# Patient Record
Sex: FEMALE | Race: ASIAN | NOT HISPANIC OR LATINO | URBAN - METROPOLITAN AREA
[De-identification: names, ages, dates, MRNs, and addresses within clinical notes are randomized per-mention and may not be internally consistent; named-entity substitution may affect disease eponyms.]

---

## 2023-03-07 ENCOUNTER — INPATIENT (INPATIENT)
Facility: HOSPITAL | Age: 78
LOS: 1 days | Discharge: HOME | End: 2023-03-09
Attending: INTERNAL MEDICINE | Admitting: INTERNAL MEDICINE
Payer: MEDICARE

## 2023-03-07 VITALS
SYSTOLIC BLOOD PRESSURE: 144 MMHG | TEMPERATURE: 98 F | RESPIRATION RATE: 18 BRPM | HEART RATE: 137 BPM | OXYGEN SATURATION: 96 % | DIASTOLIC BLOOD PRESSURE: 97 MMHG

## 2023-03-07 DIAGNOSIS — Z98.890 OTHER SPECIFIED POSTPROCEDURAL STATES: Chronic | ICD-10-CM

## 2023-03-07 DIAGNOSIS — E11.9 TYPE 2 DIABETES MELLITUS WITHOUT COMPLICATIONS: ICD-10-CM

## 2023-03-07 DIAGNOSIS — I48.20 CHRONIC ATRIAL FIBRILLATION, UNSPECIFIED: ICD-10-CM

## 2023-03-07 DIAGNOSIS — I50.9 HEART FAILURE, UNSPECIFIED: ICD-10-CM

## 2023-03-07 DIAGNOSIS — I10 ESSENTIAL (PRIMARY) HYPERTENSION: ICD-10-CM

## 2023-03-07 DIAGNOSIS — I48.91 UNSPECIFIED ATRIAL FIBRILLATION: ICD-10-CM

## 2023-03-07 DIAGNOSIS — Z29.9 ENCOUNTER FOR PROPHYLACTIC MEASURES, UNSPECIFIED: ICD-10-CM

## 2023-03-07 DIAGNOSIS — R00.0 TACHYCARDIA, UNSPECIFIED: ICD-10-CM

## 2023-03-07 LAB
ALBUMIN SERPL ELPH-MCNC: 4.6 G/DL — SIGNIFICANT CHANGE UP (ref 3.3–5)
ALP SERPL-CCNC: 62 U/L — SIGNIFICANT CHANGE UP (ref 40–120)
ALT FLD-CCNC: 13 U/L — SIGNIFICANT CHANGE UP (ref 4–33)
ANION GAP SERPL CALC-SCNC: 9 MMOL/L — SIGNIFICANT CHANGE UP (ref 7–14)
APTT BLD: 38.9 SEC — HIGH (ref 27–36.3)
AST SERPL-CCNC: 15 U/L — SIGNIFICANT CHANGE UP (ref 4–32)
BASOPHILS # BLD AUTO: 0.04 K/UL — SIGNIFICANT CHANGE UP (ref 0–0.2)
BASOPHILS NFR BLD AUTO: 0.5 % — SIGNIFICANT CHANGE UP (ref 0–2)
BILIRUB SERPL-MCNC: 0.6 MG/DL — SIGNIFICANT CHANGE UP (ref 0.2–1.2)
BUN SERPL-MCNC: 10 MG/DL — SIGNIFICANT CHANGE UP (ref 7–23)
CALCIUM SERPL-MCNC: 9.9 MG/DL — SIGNIFICANT CHANGE UP (ref 8.4–10.5)
CHLORIDE SERPL-SCNC: 95 MMOL/L — LOW (ref 98–107)
CO2 SERPL-SCNC: 29 MMOL/L — SIGNIFICANT CHANGE UP (ref 22–31)
CREAT SERPL-MCNC: 0.67 MG/DL — SIGNIFICANT CHANGE UP (ref 0.5–1.3)
EGFR: 89 ML/MIN/1.73M2 — SIGNIFICANT CHANGE UP
EOSINOPHIL # BLD AUTO: 0.26 K/UL — SIGNIFICANT CHANGE UP (ref 0–0.5)
EOSINOPHIL NFR BLD AUTO: 3 % — SIGNIFICANT CHANGE UP (ref 0–6)
FLUAV AG NPH QL: SIGNIFICANT CHANGE UP
FLUBV AG NPH QL: SIGNIFICANT CHANGE UP
GLUCOSE SERPL-MCNC: 163 MG/DL — HIGH (ref 70–99)
HCT VFR BLD CALC: 38.1 % — SIGNIFICANT CHANGE UP (ref 34.5–45)
HGB BLD-MCNC: 12.6 G/DL — SIGNIFICANT CHANGE UP (ref 11.5–15.5)
IANC: 4.63 K/UL — SIGNIFICANT CHANGE UP (ref 1.8–7.4)
IMM GRANULOCYTES NFR BLD AUTO: 0.2 % — SIGNIFICANT CHANGE UP (ref 0–0.9)
INR BLD: 1.49 RATIO — HIGH (ref 0.88–1.16)
LYMPHOCYTES # BLD AUTO: 2.85 K/UL — SIGNIFICANT CHANGE UP (ref 1–3.3)
LYMPHOCYTES # BLD AUTO: 32.9 % — SIGNIFICANT CHANGE UP (ref 13–44)
MCHC RBC-ENTMCNC: 29 PG — SIGNIFICANT CHANGE UP (ref 27–34)
MCHC RBC-ENTMCNC: 33.1 GM/DL — SIGNIFICANT CHANGE UP (ref 32–36)
MCV RBC AUTO: 87.6 FL — SIGNIFICANT CHANGE UP (ref 80–100)
MONOCYTES # BLD AUTO: 0.85 K/UL — SIGNIFICANT CHANGE UP (ref 0–0.9)
MONOCYTES NFR BLD AUTO: 9.8 % — SIGNIFICANT CHANGE UP (ref 2–14)
NEUTROPHILS # BLD AUTO: 4.63 K/UL — SIGNIFICANT CHANGE UP (ref 1.8–7.4)
NEUTROPHILS NFR BLD AUTO: 53.6 % — SIGNIFICANT CHANGE UP (ref 43–77)
NRBC # BLD: 0 /100 WBCS — SIGNIFICANT CHANGE UP (ref 0–0)
NRBC # FLD: 0 K/UL — SIGNIFICANT CHANGE UP (ref 0–0)
NT-PROBNP SERPL-SCNC: 566 PG/ML — HIGH
PLATELET # BLD AUTO: 273 K/UL — SIGNIFICANT CHANGE UP (ref 150–400)
POTASSIUM SERPL-MCNC: 4.5 MMOL/L — SIGNIFICANT CHANGE UP (ref 3.5–5.3)
POTASSIUM SERPL-SCNC: 4.5 MMOL/L — SIGNIFICANT CHANGE UP (ref 3.5–5.3)
PROT SERPL-MCNC: 7.9 G/DL — SIGNIFICANT CHANGE UP (ref 6–8.3)
PROTHROM AB SERPL-ACNC: 17.3 SEC — HIGH (ref 10.5–13.4)
RBC # BLD: 4.35 M/UL — SIGNIFICANT CHANGE UP (ref 3.8–5.2)
RBC # FLD: 13.1 % — SIGNIFICANT CHANGE UP (ref 10.3–14.5)
RSV RNA NPH QL NAA+NON-PROBE: SIGNIFICANT CHANGE UP
SARS-COV-2 RNA SPEC QL NAA+PROBE: SIGNIFICANT CHANGE UP
SODIUM SERPL-SCNC: 133 MMOL/L — LOW (ref 135–145)
TROPONIN T, HIGH SENSITIVITY RESULT: 10 NG/L — SIGNIFICANT CHANGE UP
TROPONIN T, HIGH SENSITIVITY RESULT: 10 NG/L — SIGNIFICANT CHANGE UP
TSH SERPL-MCNC: 3.87 UIU/ML — SIGNIFICANT CHANGE UP (ref 0.27–4.2)
WBC # BLD: 8.65 K/UL — SIGNIFICANT CHANGE UP (ref 3.8–10.5)
WBC # FLD AUTO: 8.65 K/UL — SIGNIFICANT CHANGE UP (ref 3.8–10.5)

## 2023-03-07 PROCEDURE — 99291 CRITICAL CARE FIRST HOUR: CPT | Mod: GC

## 2023-03-07 PROCEDURE — 99223 1ST HOSP IP/OBS HIGH 75: CPT

## 2023-03-07 PROCEDURE — 71045 X-RAY EXAM CHEST 1 VIEW: CPT | Mod: 26

## 2023-03-07 RX ORDER — APIXABAN 2.5 MG/1
5 TABLET, FILM COATED ORAL EVERY 12 HOURS
Refills: 0 | Status: DISCONTINUED | OUTPATIENT
Start: 2023-03-07 | End: 2023-03-09

## 2023-03-07 RX ORDER — METOPROLOL TARTRATE 50 MG
25 TABLET ORAL ONCE
Refills: 0 | Status: COMPLETED | OUTPATIENT
Start: 2023-03-07 | End: 2023-03-07

## 2023-03-07 RX ORDER — INSULIN LISPRO 100/ML
VIAL (ML) SUBCUTANEOUS AT BEDTIME
Refills: 0 | Status: DISCONTINUED | OUTPATIENT
Start: 2023-03-07 | End: 2023-03-09

## 2023-03-07 RX ORDER — INSULIN GLARGINE 100 [IU]/ML
20 INJECTION, SOLUTION SUBCUTANEOUS AT BEDTIME
Refills: 0 | Status: DISCONTINUED | OUTPATIENT
Start: 2023-03-07 | End: 2023-03-08

## 2023-03-07 RX ORDER — DEXTROSE 50 % IN WATER 50 %
25 SYRINGE (ML) INTRAVENOUS ONCE
Refills: 0 | Status: DISCONTINUED | OUTPATIENT
Start: 2023-03-07 | End: 2023-03-09

## 2023-03-07 RX ORDER — METOPROLOL TARTRATE 50 MG
5 TABLET ORAL
Refills: 0 | Status: COMPLETED | OUTPATIENT
Start: 2023-03-07 | End: 2023-03-07

## 2023-03-07 RX ORDER — SODIUM CHLORIDE 9 MG/ML
1000 INJECTION, SOLUTION INTRAVENOUS
Refills: 0 | Status: DISCONTINUED | OUTPATIENT
Start: 2023-03-07 | End: 2023-03-09

## 2023-03-07 RX ORDER — ACETAMINOPHEN 500 MG
650 TABLET ORAL EVERY 6 HOURS
Refills: 0 | Status: DISCONTINUED | OUTPATIENT
Start: 2023-03-07 | End: 2023-03-09

## 2023-03-07 RX ORDER — FLUTICASONE FUROATE AND VILANTEROL TRIFENATATE 100; 25 UG/1; UG/1
0 POWDER RESPIRATORY (INHALATION)
Qty: 0 | Refills: 0 | DISCHARGE

## 2023-03-07 RX ORDER — DEXTROSE 50 % IN WATER 50 %
12.5 SYRINGE (ML) INTRAVENOUS ONCE
Refills: 0 | Status: DISCONTINUED | OUTPATIENT
Start: 2023-03-07 | End: 2023-03-09

## 2023-03-07 RX ORDER — ALBUTEROL 90 UG/1
2 AEROSOL, METERED ORAL
Qty: 0 | Refills: 0 | DISCHARGE

## 2023-03-07 RX ORDER — SOTALOL HCL 120 MG
120 TABLET ORAL ONCE
Refills: 0 | Status: DISCONTINUED | OUTPATIENT
Start: 2023-03-07 | End: 2023-03-07

## 2023-03-07 RX ORDER — GLUCAGON INJECTION, SOLUTION 0.5 MG/.1ML
1 INJECTION, SOLUTION SUBCUTANEOUS ONCE
Refills: 0 | Status: DISCONTINUED | OUTPATIENT
Start: 2023-03-07 | End: 2023-03-09

## 2023-03-07 RX ORDER — METOPROLOL TARTRATE 50 MG
5 TABLET ORAL ONCE
Refills: 0 | Status: COMPLETED | OUTPATIENT
Start: 2023-03-07 | End: 2023-03-07

## 2023-03-07 RX ORDER — APIXABAN 2.5 MG/1
1 TABLET, FILM COATED ORAL
Qty: 0 | Refills: 0 | DISCHARGE

## 2023-03-07 RX ORDER — SIMVASTATIN 20 MG/1
10 TABLET, FILM COATED ORAL AT BEDTIME
Refills: 0 | Status: DISCONTINUED | OUTPATIENT
Start: 2023-03-07 | End: 2023-03-09

## 2023-03-07 RX ORDER — SIMVASTATIN 20 MG/1
1 TABLET, FILM COATED ORAL
Qty: 0 | Refills: 0 | DISCHARGE

## 2023-03-07 RX ORDER — DEXTROSE 50 % IN WATER 50 %
15 SYRINGE (ML) INTRAVENOUS ONCE
Refills: 0 | Status: DISCONTINUED | OUTPATIENT
Start: 2023-03-07 | End: 2023-03-09

## 2023-03-07 RX ORDER — INSULIN LISPRO 100/ML
VIAL (ML) SUBCUTANEOUS
Refills: 0 | Status: DISCONTINUED | OUTPATIENT
Start: 2023-03-07 | End: 2023-03-09

## 2023-03-07 RX ORDER — PANTOPRAZOLE SODIUM 20 MG/1
40 TABLET, DELAYED RELEASE ORAL
Refills: 0 | Status: DISCONTINUED | OUTPATIENT
Start: 2023-03-07 | End: 2023-03-09

## 2023-03-07 RX ORDER — PANTOPRAZOLE SODIUM 20 MG/1
1 TABLET, DELAYED RELEASE ORAL
Qty: 0 | Refills: 0 | DISCHARGE

## 2023-03-07 RX ORDER — FAMOTIDINE 10 MG/ML
1 INJECTION INTRAVENOUS
Qty: 0 | Refills: 0 | DISCHARGE

## 2023-03-07 RX ORDER — METOCLOPRAMIDE HCL 10 MG
5 TABLET ORAL ONCE
Refills: 0 | Status: DISCONTINUED | OUTPATIENT
Start: 2023-03-07 | End: 2023-03-07

## 2023-03-07 RX ADMIN — Medication 25 MILLIGRAM(S): at 20:40

## 2023-03-07 RX ADMIN — Medication 5 MILLIGRAM(S): at 17:30

## 2023-03-07 RX ADMIN — Medication 5 MILLIGRAM(S): at 17:16

## 2023-03-07 RX ADMIN — Medication 5 MILLIGRAM(S): at 18:06

## 2023-03-07 NOTE — CONSULT NOTE ADULT - ASSESSMENT
79 YO F with Mhx of DMII, COPD, and  afib s/p 3 ablations (10 years ago, 4 years ago, and 2/2/23) on eliquis, who presents with worsening palpitations found to be tachycardic. Cardiology consulted for concern for afib with RVR.     #Tachycardia   -s/p metoprolol 5mg IVP x3 in ED with no effect.   -likely an atrial tachycardia   -Pt with hx of atrial fibrillation s/p 3 ablations as below. (pt of Dr. Johnson)  -c/w sotalol  -Pt with baseline HR in the 120-130's at home  -Obtain TTE  -If LVEF is ok, can use diltiazem  -c/w eliquis  -Please keep NPO after midnight for possible NEMO/ DCCV    #Afib s/p 3 ablations (last was one month ago)  -c/w sotalol  -c/w eliquis    [place] Lu, Cardiology Fellow, F-1    For all New Consults and Questions:  [email or web address]   Login: [email or web address]    *** Note not final until signed by attending 77 YO F with Mhx of DMII, COPD, and  afib s/p 3 ablations (10 years ago, 4 years ago, and 2/2/23) on eliquis, who presents with worsening palpitations found to be tachycardic. Cardiology consulted for concern for afib with RVR.     #Tachycardia   -s/p metoprolol 5mg IVP x3 in ED with no effect.   -likely atrial tachycardia   -Pt with hx of atrial fibrillation s/p 3 ablations as below. (pt of Dr. Johnson)  -c/w sotalol  -Pt with baseline HR in the 120-130's at home  -Obtain TTE  -If LVEF is ok, can use diltiazem  -c/w eliquis  -check TSH/FT4, Lipids, A1c  -Please keep NPO after midnight for possible NEMO/ DCCV    #Afib s/p 3 ablations (last was one month ago)  -c/w sotalol  -c/w eliquis    [place] Lu, Cardiology Fellow, F-1    For all New Consults and Questions:  [email or web address]   Login: [email or web address]    *** Note not final until signed by attending

## 2023-03-07 NOTE — ED PROVIDER NOTE - CLINICAL SUMMARY MEDICAL DECISION MAKING FREE TEXT BOX
Layne Cisneros MD, PGY-3: (INCOMPLETE) Layne Cisneros MD, PGY-3: 79YO F hx AF on AC, s/p ablation Feb 2023, CHF, p/w CP, palpitations. vs: tachycardic, normotensive. concern for AF w/ RVR vs. SVT. also consider ACS. plan for metoprolol, consider admission for EP ablation given recurrent symptoms. plan for basic labs, trop, ekg.

## 2023-03-07 NOTE — H&P ADULT - PROBLEM SELECTOR PLAN 2
s/p mult ablations (last one 2/2023), chadsvasc of 6. plan as above. continue eliquis. continue sotalol pending repeat EKG.

## 2023-03-07 NOTE — CHART NOTE - NSCHARTNOTEFT_GEN_A_CORE
repeat ekg done w/ QTC<500, will give one dose sotalol now, repeat EKG in AM before subsequent doses repeat ekg done, discussed with EP overnight, they rec holding sotalol for now and reassess in AM

## 2023-03-07 NOTE — H&P ADULT - HISTORY OF PRESENT ILLNESS
Pt is a 78 year old woman w/ afib on AC s/p ablation in 2/2023, HTN, HLD, DM2 on insulin, GERD who presents to the ER w/ 1 day onset of worsening palpitations, chest pain, shoulder pain and shortness of breath. She reports that since her ablation in February she has not felt well but has not followed up with her cards yet either. She is compliant with all medications but reports intermittent palpitations. She reports yday during the day she began feeling vague shoulder, back and chest pain as well as palpitations which prevented her from sleeping. She had some associated sob which also lasted to this AM. No fever, cough, abd pain, diarrhea or dysuria. no LE edema reported. In the ER pt was found tachycardic and given mult rounds of metoprolol.

## 2023-03-07 NOTE — H&P ADULT - NSHPLABSRESULTS_GEN_ALL_CORE
03-07    133<L>  |  95<L>  |  10  ----------------------------<  163<H>  4.5   |  29  |  0.67    Ca    9.9      07 Mar 2023 16:33    TPro  7.9  /  Alb  4.6  /  TBili  0.6  /  DBili  x   /  AST  15  /  ALT  13  /  AlkPhos  62  03-07                            12.6   8.65  )-----------( 273      ( 07 Mar 2023 16:33 )             38.1             LIVER FUNCTIONS - ( 07 Mar 2023 16:33 )  Alb: 4.6 g/dL / Pro: 7.9 g/dL / ALK PHOS: 62 U/L / ALT: 13 U/L / AST: 15 U/L / GGT: x             PT/INR - ( 07 Mar 2023 16:33 )   PT: 17.3 sec;   INR: 1.49 ratio         PTT - ( 07 Mar 2023 16:33 )  PTT:38.9 sec    EKG: sinus tachycardia,     CXR: clear lungs

## 2023-03-07 NOTE — H&P ADULT - ASSESSMENT
78 year old woman w/ afib on AC s/p ablation in 2/2023, HTN, HLD, DM2 on insulin, GERD who presents to the ER w/ 1 day onset of worsening palpitations, chest pain, shoulder pain and shortness of breath.  In the ER pt was found tachycardic and given mult rounds of metoprolol. Admitted for further evaluation

## 2023-03-07 NOTE — ED PROVIDER NOTE - NSICDXPASTMEDICALHX_GEN_ALL_CORE_FT
PAST MEDICAL HISTORY:  Asthma     Chronic atrial fibrillation     Chronic heart failure     Diabetes mellitus     Hyperlipidemia     Hypertension

## 2023-03-07 NOTE — CHART NOTE - NSCHARTNOTEFT_GEN_A_CORE
-EKG review for QTc, manually calculated to be >500.   -can hold sotalol for tonight.   -repeat EKG in am and re-assess  -case discussed with Dr. Sugar Dawn, cardiology fellow.  -call EP [place] [phone] for a question after 7am.

## 2023-03-07 NOTE — H&P ADULT - NSHPREVIEWOFSYSTEMS_GEN_ALL_CORE
REVIEW OF SYSTEMS:    CONSTITUTIONAL: No weakness, fevers or chills  EYES/ENT: No visual changes;  No dysphagia  NECK: No pain or stiffness  RESPIRATORY: +dyspnea. No cough, wheezing, or hemoptysis  CARDIOVASCULAR: +chest pain and palpitations; No lower extremity edema  GASTROINTESTINAL: No abdominal or epigastric pain. No nausea, vomiting, or hematemesis; No diarrhea or constipation. No melena or hematochezia.  GENITOURINARY: No dysuria, frequency or hematuria  NEUROLOGICAL: No numbness or weakness  SKIN: No itching, burning, rashes, or lesions   MSK: +back pain

## 2023-03-07 NOTE — ED PROVIDER NOTE - OBJECTIVE STATEMENT
(INCOMPLETE) 79YO F hx AF on AC, s/p ablation Feb 2023, CHF, p/w CP, palpitations. onset 1d prior. R chest, shoulder, lower back. a/w SOB. pt on sotalol, spironolactone, Eliquis, no missed doses. all care through [facility] cards Dr. Vaibhav Raphael. denies recent fever, n/v, abd pain.

## 2023-03-07 NOTE — ED PROVIDER NOTE - PROGRESS NOTE DETAILS
Layne Cisneros MD, PGY-3: Pt remains tachycardic despite metoprolol IV x 3. Cards contacted at 7pm. will eval pt. stable BP persistently. Layne Cisneros PGY3 signout given to hospitalisttory for cardioversion in AM

## 2023-03-07 NOTE — ED ADULT TRIAGE NOTE - CHIEF COMPLAINT QUOTE
pt was getting physical therapy at home and had elevated heart rate - 130. pt arrives tachycardic 136, had an ablation 1 month ago. pt c/o palpitations, right sided chest pain, right shoulder pain and lower back pain intermittent x 1 month. hx. DM2, HTN, HLD, anemia, COPD

## 2023-03-07 NOTE — ED ADULT NURSE NOTE - OBJECTIVE STATEMENT
Pt came to ED room 3, aaox4, ambulatory, breathing even and unlabored in bed. PMHx of HTN, cardiac ablation. Pt came to ED after having PT this AM. Pt states before her PT her vitals were taken and showed a HR of 140s. Pt states she has recently been staying in the 140s when she is active. Pt came to ED to get examined, she states she "has felt off since her last ablation sx". Pt states since last month she has felt chest pain, dizziness, and headache everyday. Pt denies SOB. Lung sounds clear, heart sounds normal, abdomen soft and non distended. #18G placed in right AC, labs drawn and sent, bed in lowest position, call bell within reach, pt on tele monitor showing sinus tachycardia.

## 2023-03-07 NOTE — PATIENT PROFILE ADULT - FALL HARM RISK - HARM RISK INTERVENTIONS
Assistance with ambulation/Assistance OOB with selected safe patient handling equipment/Communicate Risk of Fall with Harm to all staff/Discuss with provider need for PT consult/Monitor gait and stability/Provide patient with walking aids - walker, cane, crutches/Reinforce activity limits and safety measures with patient and family/Tailored Fall Risk Interventions/Use of alarms - bed, chair and/or voice tab/Visual Cue: Yellow wristband and red socks/Bed in lowest position, wheels locked, appropriate side rails in place/Call bell, personal items and telephone in reach/Instruct patient to call for assistance before getting out of bed or chair/Non-slip footwear when patient is out of bed/[place] to call system/Physically safe environment - no spills, clutter or unnecessary equipment/Purposeful Proactive Rounding/Room/bathroom lighting operational, light cord in reach

## 2023-03-07 NOTE — ED PROVIDER NOTE - PHYSICAL EXAMINATION
Gen: WDWN, NAD  HEENT: EOMI, no nasal discharge, mucous membranes moist  CV: fast rate, regular rhythm, 2+ radial pulse L  Resp:  no accessory muscle use, no increased work of breathing  GI: Abdomen soft non-distended, NTTP  MSK: No open wounds, no bruising, no LE edema  Neuro: A&Ox4, following commands, moving all four extremities spontaneously  Psych: appropriate mood

## 2023-03-07 NOTE — CONSULT NOTE ADULT - SUBJECTIVE AND OBJECTIVE BOX
Patient seen and evaluated at bedside    Chief Complaint:palpitations    HPI:  77 YO F with Mhx of DMII, COPD, and  afib s/p 3 ablations (10 years ago, 4 years ago, and 2/2/23) on eliquis, who presents with worsening palpitations. Pt states that she has had afib for many years, had her 3rd ablation last month which was unsuccessful, and her heart rate at baseline is 120-130. She is on sotalol and takes it daily. Her palpitations were more intense today, so she came in  She denies any chest pain, syncope, shortness of breath, nausea, or vomiting, She does also complain of back pain which is chronic.       PMHx:      DMII, COPD, and  afib       Allergies:  Amaryl (Rash)  Januvia (Rash)      Home Meds:  losartan  sotalol  spironolactone   HCTZ  does not recall the others          FAMILY HISTORY:      Social History:  Smoking History: significant second hand smoke ( was heavy smoker)  Alcohol Use:denies  Drug Use:denies    REVIEW OF SYSTEMS:  Constitutional:     [x ] negative [ ] fevers [ ] chills [ ] weight loss [ ] weight gain  HEENT:                  [ x] negative [ ] dry eyes [ ] eye irritation [ ] postnasal drip [ ] nasal congestion  CV:                        as above [ ] negative  [ ] chest pain [ ] orthopnea [ ] palpitations [ ] murmur  Resp:                    as above [ ] negative [ ] cough [ ] shortness of breath [ ] dyspnea [ ] wheezing [ ] sputum [ ]hemoptysis  GI:                          [x ] negative [ ] nausea [ ] vomiting [ ] diarrhea [ ] constipation [ ] abd pain [ ] dysphagia   :                        [ x] negative [ ] dysuria [ ] nocturia [ ] hematuria [ ] increased urinary frequency  Musculoskeletal: [ x] negative [ ] back pain [ ] myalgias [ ] arthralgias [ ] fracture  Skin:                       [ x] negative [ ] rash [ ] itch  Neurological:        [ x] negative [ ] headache [ ] dizziness [ ] syncope [ ] weakness [ ] numbness  Psychiatric:           [ x] negative [ ] anxiety [ ] depression  Endocrine:            [x ] negative [ ] diabetes [ ] thyroid problem  Heme/Lymph:      [ x negative [ ] anemia [ ] bleeding problem  Allergic/Immune: [x ] negative [ ] itchy eyes [ ] nasal discharge [ ] hives [ ] angioedema    [ x] All other systems negative  [ ] Unable to assess ROS due to      Physical Exam:  T(F): 98.2 (03-07), Max: 98.4 (03-07)  HR: 135 (03-07) (135 - 139)  BP: 126/90 (03-07) (110/79 - 144/97)  RR: 16 (03-07)  SpO2: 100% (03-07)  GENERAL: No acute distress, well-developed  HEAD:  Atraumatic, Normocephalic  ENT: EOMI, PERRLA, conjunctiva and sclera clear, Neck supple, No JVD, moist mucosa  CHEST/LUNG: Clear to auscultation bilaterally; No wheeze, equal breath sounds bilaterally   BACK: No spinal tenderness  HEART:irregular rhythm, tachycardic,   ABDOMEN: Soft, Nontender, Nondistended; Bowel sounds present  EXTREMITIES:  No clubbing, cyanosis, or edema  PSYCH: Nl behavior, nl affect  NEUROLOGY: AAOx3, non-focal, cranial nerves intact  SKIN: Normal color, No rashes or lesions      Cardiovascular Diagnostic Testing:    ECG: Personally reviewed: tachycardic      CXR: Personally reviewed    Labs: Personally reviewed                        12.6   8.65  )-----------( 273      ( 07 Mar 2023 16:33 )             38.1     03-07    133<L>  |  95<L>  |  10  ----------------------------<  163<H>  4.5   |  29  |  0.67    Ca    9.9      07 Mar 2023 16:33    TPro  7.9  /  Alb  4.6  /  TBili  0.6  /  DBili  x   /  AST  15  /  ALT  13  /  AlkPhos  62  03-07    PT/INR - ( 07 Mar 2023 16:33 )   PT: 17.3 sec;   INR: 1.49 ratio         PTT - ( 07 Mar 2023 16:33 )  PTT:38.9 sec  Serum Pro-Brain Natriuretic Peptide: 566 pg/mL (03-07 @ 16:33)        Thyroid Stimulating Hormone, Serum: 3.87 uIU/mL (03-07 @ 16:33)

## 2023-03-07 NOTE — H&P ADULT - PROBLEM SELECTOR PLAN 3
pt on lantus 25 units qhs, will continue at 20 units for now  -on short acting up to 15 units TID, but says it depends on her sugars. use sliding scale for now  -NPO post midnight for possible DCCV

## 2023-03-07 NOTE — H&P ADULT - NSHPPHYSICALEXAM_GEN_ALL_CORE
PHYSICAL EXAM:  GENERAL: NAD, well-developed, well-nourished  HEAD:  Atraumatic, Normocephalic  EYES: EOMI, PERRLA, conjunctiva and sclera clear  NECK: Supple, No JVD  CHEST/LUNG: Clear to auscultation bilaterally; No wheezes, rales or rhonchi  HEART: tachycardic; No murmurs, rubs, or gallops, (+)S1, S2  ABDOMEN: Soft, Nontender, Nondistended; Normal Bowel sounds   EXTREMITIES:  2+ Peripheral Pulses, No clubbing, cyanosis, or edema  PSYCH: normal mood and affect  NEUROLOGY: AAOx3, non-focal  SKIN: No rashes or lesions

## 2023-03-07 NOTE — H&P ADULT - PROBLEM SELECTOR PLAN 1
persistent tachycardia to the 130s likely causing pt's symptoms of palpitations, CP and shortness of breath  -rapid atrial tach vs rapid afib vs sinus tach. Blood pressure currently stable  -Pt reports good compliance to eliquis, will continue. CXR appears clear and pt with no peripheral edema, less likely heart failure exacerbation  -trend troponin, obtain echo  -EP consulted, possible DCCV in AM, will continue sotalol pending repeat EKG to check on QTC

## 2023-03-07 NOTE — PATIENT PROFILE ADULT - FUNCTIONAL ASSESSMENT - BASIC MOBILITY 2.
REFILL ON ipratropium-albuterol (DUO-NEB) 0.5-2.5 mg/3 ml nebulizer    THERE NEEDS TO BE A DIAGNOSIS CODE IN ORDER FOR INSURANCE TO COVER    4 = No assist / stand by assistance

## 2023-03-07 NOTE — ED ADULT NURSE REASSESSMENT NOTE - NS ED NURSE REASSESS COMMENT FT1
report given to 4N TRINA Slater
Patient at baseline mental status. Denies chest pain, headache and dizziness. Breathing even and unlabored. No pallor or diaphoresis noted at this time. Patient noted to be tachycardic on monitor, with underlying rhythm of a fib. Patient to be seen by cardiology for interventions and cardioversion.
report received from RN Mohit- pt resting comfortably, Attg aware of current HR 130s, no distress on exam, POC: admission with cardioversion in AM, no pending interventions

## 2023-03-07 NOTE — ED PROVIDER NOTE - ATTENDING CONTRIBUTION TO CARE
CRITICAL CARE TIME WAS NECESSARY TO TREAT OR PREVENT LIFE THREATENING DETERIORATION FROM THE FOLLOWING CONDITIONS:  [x  ] Heart Failure and/or Circulatory Collapse and or MI/NSTEMI  [  ] Sepsis [  ] Respiratory failure  [  ]CNS Failure or Compromise    [  ]Dehydration [  ]Renal Failure [  ]Hepatic Failure Sepsis [  ]Endocrine Crisis  [  ]Metabolic Crisis  [  ]Toxidrome   [  ]Trauma    CRITICAL CARE TIME WAS SPENT BY ME IN THE FOLLOWING ACTIVITIES:  [X  ] Performance of the History and Physical Examination [X  ] Review of Old Charts [   ] IV Access and or Obtaining Necessary Diagnostic Tests   [X  ] Ordering and Performing Treatments and Interventions:   Specifically:  [  ] Ventilator Management [  ] Vascular Access Procedures [  ] NGT Placement  [  ] Transcutaneous Pacing  [ X ] Establishment of Goals of Care with the Patient or Their Designated Representative  [X  ] Developing and Evaluating Treatment Plan with Consultants and/or the Primary Provider  [ x ] Serial Reevaluation of the Patients Condition and Response to Therapeutic Measures   Specifically: [x  ] Interpretation of Cardiac and Respiratory Parameters  [X  ]Ordering and Interpretating  Diagnostic Studies  Comments:  The patient is a 78y Female who has a past medical and surgery history of HTN HLD DM CHF Asthma CAF s/p 2 ablations on sotolol/eliquis PTED with   Vital Signs   PE: as described; my additions and exceptions are noted in the chart    DATA:  EKG: SVT@138 (? AVRNTgiven ? short p in front of every QRS; NSST changes no old EKG   RESULTS: All significant/relevant labs and imaging results present during the time of evaluation have been entered into chart through pullset function and are discussed below    IMPRESSION/RISK:  Dx=SVT probably atrial in origin    Consideration include on sotolol recieived 15 lopressor IV w/o controlling HR; patient already on AC  Plan  Patient case d/w cardiology/EPS would add PO metoprolol; will admit for possible cardioversion if no response overnight patient already on AC  admit to telemetry

## 2023-03-08 ENCOUNTER — TRANSCRIPTION ENCOUNTER (OUTPATIENT)
Age: 78
End: 2023-03-08

## 2023-03-08 LAB
A1C WITH ESTIMATED AVERAGE GLUCOSE RESULT: 6.9 % — HIGH (ref 4–5.6)
ALBUMIN SERPL ELPH-MCNC: 4.1 G/DL — SIGNIFICANT CHANGE UP (ref 3.3–5)
ALP SERPL-CCNC: 57 U/L — SIGNIFICANT CHANGE UP (ref 40–120)
ALT FLD-CCNC: 13 U/L — SIGNIFICANT CHANGE UP (ref 4–33)
ANION GAP SERPL CALC-SCNC: 10 MMOL/L — SIGNIFICANT CHANGE UP (ref 7–14)
AST SERPL-CCNC: 19 U/L — SIGNIFICANT CHANGE UP (ref 4–32)
BASOPHILS # BLD AUTO: 0.03 K/UL — SIGNIFICANT CHANGE UP (ref 0–0.2)
BASOPHILS NFR BLD AUTO: 0.4 % — SIGNIFICANT CHANGE UP (ref 0–2)
BILIRUB DIRECT SERPL-MCNC: <0.2 MG/DL — SIGNIFICANT CHANGE UP (ref 0–0.3)
BILIRUB INDIRECT FLD-MCNC: >0.7 MG/DL — SIGNIFICANT CHANGE UP (ref 0–1)
BILIRUB SERPL-MCNC: 0.9 MG/DL — SIGNIFICANT CHANGE UP (ref 0.2–1.2)
BUN SERPL-MCNC: 12 MG/DL — SIGNIFICANT CHANGE UP (ref 7–23)
CALCIUM SERPL-MCNC: 9.4 MG/DL — SIGNIFICANT CHANGE UP (ref 8.4–10.5)
CHLORIDE SERPL-SCNC: 98 MMOL/L — SIGNIFICANT CHANGE UP (ref 98–107)
CHOLEST SERPL-MCNC: 152 MG/DL — SIGNIFICANT CHANGE UP
CO2 SERPL-SCNC: 24 MMOL/L — SIGNIFICANT CHANGE UP (ref 22–31)
CREAT SERPL-MCNC: 0.65 MG/DL — SIGNIFICANT CHANGE UP (ref 0.5–1.3)
EGFR: 90 ML/MIN/1.73M2 — SIGNIFICANT CHANGE UP
EOSINOPHIL # BLD AUTO: 0.25 K/UL — SIGNIFICANT CHANGE UP (ref 0–0.5)
EOSINOPHIL NFR BLD AUTO: 3.2 % — SIGNIFICANT CHANGE UP (ref 0–6)
ESTIMATED AVERAGE GLUCOSE: 151 — SIGNIFICANT CHANGE UP
GLUCOSE BLDC GLUCOMTR-MCNC: 250 MG/DL — HIGH (ref 70–99)
GLUCOSE SERPL-MCNC: 219 MG/DL — HIGH (ref 70–99)
HCT VFR BLD CALC: 39 % — SIGNIFICANT CHANGE UP (ref 34.5–45)
HCV AB S/CO SERPL IA: 0.15 S/CO — SIGNIFICANT CHANGE UP (ref 0–0.99)
HCV AB SERPL-IMP: SIGNIFICANT CHANGE UP
HDLC SERPL-MCNC: 60 MG/DL — SIGNIFICANT CHANGE UP
HGB BLD-MCNC: 12.3 G/DL — SIGNIFICANT CHANGE UP (ref 11.5–15.5)
IANC: 4.13 K/UL — SIGNIFICANT CHANGE UP (ref 1.8–7.4)
IMM GRANULOCYTES NFR BLD AUTO: 0.1 % — SIGNIFICANT CHANGE UP (ref 0–0.9)
INR BLD: 1.6 RATIO — HIGH (ref 0.88–1.16)
LIPID PNL WITH DIRECT LDL SERPL: 70 MG/DL — SIGNIFICANT CHANGE UP
LYMPHOCYTES # BLD AUTO: 2.71 K/UL — SIGNIFICANT CHANGE UP (ref 1–3.3)
LYMPHOCYTES # BLD AUTO: 34.3 % — SIGNIFICANT CHANGE UP (ref 13–44)
MAGNESIUM SERPL-MCNC: 2.1 MG/DL — SIGNIFICANT CHANGE UP (ref 1.6–2.6)
MCHC RBC-ENTMCNC: 28.3 PG — SIGNIFICANT CHANGE UP (ref 27–34)
MCHC RBC-ENTMCNC: 31.5 GM/DL — LOW (ref 32–36)
MCV RBC AUTO: 89.7 FL — SIGNIFICANT CHANGE UP (ref 80–100)
MONOCYTES # BLD AUTO: 0.78 K/UL — SIGNIFICANT CHANGE UP (ref 0–0.9)
MONOCYTES NFR BLD AUTO: 9.9 % — SIGNIFICANT CHANGE UP (ref 2–14)
NEUTROPHILS # BLD AUTO: 4.13 K/UL — SIGNIFICANT CHANGE UP (ref 1.8–7.4)
NEUTROPHILS NFR BLD AUTO: 52.1 % — SIGNIFICANT CHANGE UP (ref 43–77)
NON HDL CHOLESTEROL: 92 MG/DL — SIGNIFICANT CHANGE UP
NRBC # BLD: 0 /100 WBCS — SIGNIFICANT CHANGE UP (ref 0–0)
NRBC # FLD: 0 K/UL — SIGNIFICANT CHANGE UP (ref 0–0)
PLATELET # BLD AUTO: 226 K/UL — SIGNIFICANT CHANGE UP (ref 150–400)
POTASSIUM SERPL-MCNC: 4.1 MMOL/L — SIGNIFICANT CHANGE UP (ref 3.5–5.3)
POTASSIUM SERPL-SCNC: 4.1 MMOL/L — SIGNIFICANT CHANGE UP (ref 3.5–5.3)
PROT SERPL-MCNC: 7 G/DL — SIGNIFICANT CHANGE UP (ref 6–8.3)
PROTHROM AB SERPL-ACNC: 18.6 SEC — HIGH (ref 10.5–13.4)
RBC # BLD: 4.35 M/UL — SIGNIFICANT CHANGE UP (ref 3.8–5.2)
RBC # FLD: 13.1 % — SIGNIFICANT CHANGE UP (ref 10.3–14.5)
SODIUM SERPL-SCNC: 132 MMOL/L — LOW (ref 135–145)
TRIGL SERPL-MCNC: 109 MG/DL — SIGNIFICANT CHANGE UP
WBC # BLD: 7.91 K/UL — SIGNIFICANT CHANGE UP (ref 3.8–10.5)
WBC # FLD AUTO: 7.91 K/UL — SIGNIFICANT CHANGE UP (ref 3.8–10.5)

## 2023-03-08 PROCEDURE — 93306 TTE W/DOPPLER COMPLETE: CPT | Mod: 26

## 2023-03-08 PROCEDURE — 99232 SBSQ HOSP IP/OBS MODERATE 35: CPT

## 2023-03-08 RX ORDER — INSULIN GLARGINE 100 [IU]/ML
22 INJECTION, SOLUTION SUBCUTANEOUS AT BEDTIME
Refills: 0 | Status: DISCONTINUED | OUTPATIENT
Start: 2023-03-08 | End: 2023-03-09

## 2023-03-08 RX ORDER — METOPROLOL TARTRATE 50 MG
1 TABLET ORAL
Qty: 120 | Refills: 0
Start: 2023-03-08 | End: 2023-04-06

## 2023-03-08 RX ORDER — METOPROLOL TARTRATE 50 MG
50 TABLET ORAL EVERY 6 HOURS
Refills: 0 | Status: DISCONTINUED | OUTPATIENT
Start: 2023-03-08 | End: 2023-03-09

## 2023-03-08 RX ORDER — SOTALOL HCL 120 MG
1 TABLET ORAL
Qty: 0 | Refills: 0 | DISCHARGE

## 2023-03-08 RX ORDER — ACETAMINOPHEN 500 MG
2 TABLET ORAL
Qty: 0 | Refills: 0 | DISCHARGE
Start: 2023-03-08

## 2023-03-08 RX ORDER — INSULIN LISPRO 100/ML
2 VIAL (ML) SUBCUTANEOUS
Refills: 0 | Status: DISCONTINUED | OUTPATIENT
Start: 2023-03-08 | End: 2023-03-09

## 2023-03-08 RX ADMIN — APIXABAN 5 MILLIGRAM(S): 2.5 TABLET, FILM COATED ORAL at 22:07

## 2023-03-08 RX ADMIN — PANTOPRAZOLE SODIUM 40 MILLIGRAM(S): 20 TABLET, DELAYED RELEASE ORAL at 05:56

## 2023-03-08 RX ADMIN — SIMVASTATIN 10 MILLIGRAM(S): 20 TABLET, FILM COATED ORAL at 22:06

## 2023-03-08 RX ADMIN — Medication 650 MILLIGRAM(S): at 15:59

## 2023-03-08 RX ADMIN — APIXABAN 5 MILLIGRAM(S): 2.5 TABLET, FILM COATED ORAL at 00:13

## 2023-03-08 RX ADMIN — SIMVASTATIN 10 MILLIGRAM(S): 20 TABLET, FILM COATED ORAL at 00:14

## 2023-03-08 RX ADMIN — Medication 50 MILLIGRAM(S): at 16:00

## 2023-03-08 RX ADMIN — Medication 50 MILLIGRAM(S): at 22:10

## 2023-03-08 RX ADMIN — APIXABAN 5 MILLIGRAM(S): 2.5 TABLET, FILM COATED ORAL at 11:27

## 2023-03-08 RX ADMIN — Medication 650 MILLIGRAM(S): at 05:55

## 2023-03-08 RX ADMIN — Medication 1: at 18:09

## 2023-03-08 RX ADMIN — Medication 650 MILLIGRAM(S): at 16:49

## 2023-03-08 RX ADMIN — Medication 1: at 09:47

## 2023-03-08 RX ADMIN — INSULIN GLARGINE 22 UNIT(S): 100 INJECTION, SOLUTION SUBCUTANEOUS at 22:10

## 2023-03-08 RX ADMIN — Medication 650 MILLIGRAM(S): at 06:55

## 2023-03-08 RX ADMIN — Medication 2 UNIT(S): at 18:30

## 2023-03-08 RX ADMIN — INSULIN GLARGINE 20 UNIT(S): 100 INJECTION, SOLUTION SUBCUTANEOUS at 00:14

## 2023-03-08 NOTE — PROGRESS NOTE ADULT - PROBLEM SELECTOR PLAN 4
- BP in 110's currently and metoprolol being added - Hold home brad and losartan outpt f/u if cleared by EP to restart losartan and brad

## 2023-03-08 NOTE — PROGRESS NOTE ADULT - SUBJECTIVE AND OBJECTIVE BOX
Interval History:  Patient resting comfortably in bed   Denies CP/SOB/palpitations/dizziness  No acute events overnight    Medications:  acetaminophen     Tablet .. 650 milliGRAM(s) Oral every 6 hours PRN  aluminum hydroxide/magnesium hydroxide/simethicone Suspension 30 milliLiter(s) Oral every 4 hours PRN  apixaban 5 milliGRAM(s) Oral every 12 hours  dextrose 5%. 1000 milliLiter(s) IV Continuous <Continuous>  dextrose 5%. 1000 milliLiter(s) IV Continuous <Continuous>  dextrose 50% Injectable 25 Gram(s) IV Push once  dextrose 50% Injectable 12.5 Gram(s) IV Push once  dextrose 50% Injectable 25 Gram(s) IV Push once  dextrose Oral Gel 15 Gram(s) Oral once PRN  glucagon  Injectable 1 milliGRAM(s) IntraMuscular once  insulin glargine Injectable (LANTUS) 20 Unit(s) SubCutaneous at bedtime  insulin lispro (ADMELOG) corrective regimen sliding scale   SubCutaneous three times a day before meals  insulin lispro (ADMELOG) corrective regimen sliding scale   SubCutaneous at bedtime  pantoprazole    Tablet 40 milliGRAM(s) Oral before breakfast  simvastatin 10 milliGRAM(s) Oral at bedtime      Vitals:  T(C): 36.4 (23 @ 10:25), Max: 37.1 (23 @ 22:00)  HR: 65 (23 @ 10:25) (65 - 139)  BP: 113/57 (23 @ 10:25) (110/79 - 144/97)  BP(mean): --  RR: 17 (23 @ 10:25) (16 - 19)  SpO2: 98% (23 @ 10:25) (96% - 100%)    Daily Height in cm: 167.64 (07 Mar 2023 22:00)    Daily Weight in k.8 (08 Mar 2023 06:00)    Weight (kg): 70.8 ( @ 22:00)    I&O's Summary      Physical Exam:  Appearance: No Acute Distress  Cardiovascular: Normal S1 S2  Respiratory: Clear to auscultation bilaterally  Gastrointestinal: Soft, Non-tender	  Skin: No cyanosis	  Neurologic: Non-focal  Extremities: No LE edema  Psychiatry: A & O x 3, Mood & affect appropriate    Labs:                        12.3   7.91  )-----------( 226      ( 08 Mar 2023 06:13 )             39.0     03-08    132<L>  |  98  |  12  ----------------------------<  219<H>  4.1   |  24  |  0.65    Ca    9.4      08 Mar 2023 06:13  Mg     2.10     03-08    TPro  7.0  /  Alb  4.1  /  TBili  0.9  /  DBili  <0.2  /  AST  19  /  ALT  13  /  AlkPhos  57  03-08    PT/INR - ( 08 Mar 2023 06:13 )   PT: 18.6 sec;   INR: 1.60 ratio         PTT - ( 07 Mar 2023 16:33 )  PTT:38.9 sec        TELEMETRY: AT (130bpm) converted to SR (70bpm) at 5:35am    Echocardiogram:  Completed/Report Pending  Interval History:  Patient resting comfortably in bed   Denies CP/SOB/palpitations/dizziness  No acute events overnight    Medications:  acetaminophen     Tablet .. 650 milliGRAM(s) Oral every 6 hours PRN  aluminum hydroxide/magnesium hydroxide/simethicone Suspension 30 milliLiter(s) Oral every 4 hours PRN  apixaban 5 milliGRAM(s) Oral every 12 hours  dextrose 5%. 1000 milliLiter(s) IV Continuous <Continuous>  dextrose 5%. 1000 milliLiter(s) IV Continuous <Continuous>  dextrose 50% Injectable 25 Gram(s) IV Push once  dextrose 50% Injectable 12.5 Gram(s) IV Push once  dextrose 50% Injectable 25 Gram(s) IV Push once  dextrose Oral Gel 15 Gram(s) Oral once PRN  glucagon  Injectable 1 milliGRAM(s) IntraMuscular once  insulin glargine Injectable (LANTUS) 20 Unit(s) SubCutaneous at bedtime  insulin lispro (ADMELOG) corrective regimen sliding scale   SubCutaneous three times a day before meals  insulin lispro (ADMELOG) corrective regimen sliding scale   SubCutaneous at bedtime  pantoprazole    Tablet 40 milliGRAM(s) Oral before breakfast  simvastatin 10 milliGRAM(s) Oral at bedtime      Vitals:  T(C): 36.4 (23 @ 10:25), Max: 37.1 (23 @ 22:00)  HR: 65 (23 @ 10:25) (65 - 139)  BP: 113/57 (23 @ 10:25) (110/79 - 144/97)  BP(mean): --  RR: 17 (23 @ 10:25) (16 - 19)  SpO2: 98% (23 @ 10:25) (96% - 100%)    Daily Height in cm: 167.64 (07 Mar 2023 22:00)    Daily Weight in k.8 (08 Mar 2023 06:00)    Weight (kg): 70.8 ( @ 22:00)    I&O's Summary      Physical Exam:  Appearance: No Acute Distress  Cardiovascular: Normal S1 S2  Respiratory: Clear to auscultation bilaterally  Gastrointestinal: Soft, Non-tender	  Skin: No cyanosis	  Neurologic: Non-focal  Extremities: No LE edema  Psychiatry: A & O x 3, Mood & affect appropriate    Labs:                        12.3   7.91  )-----------( 226      ( 08 Mar 2023 06:13 )             39.0     03-08    132<L>  |  98  |  12  ----------------------------<  219<H>  4.1   |  24  |  0.65    Ca    9.4      08 Mar 2023 06:13  Mg     2.10     03-08    TPro  7.0  /  Alb  4.1  /  TBili  0.9  /  DBili  <0.2  /  AST  19  /  ALT  13  /  AlkPhos  57  03-08    PT/INR - ( 08 Mar 2023 06:13 )   PT: 18.6 sec;   INR: 1.60 ratio         PTT - ( 07 Mar 2023 16:33 )  PTT:38.9 sec        TELEMETRY: AT (130bpm) converted to SR (70bpm) at 5:35am    Echocardiogram:  Completed/Report Pending

## 2023-03-08 NOTE — PROGRESS NOTE ADULT - PROBLEM SELECTOR PLAN 3
- monitor FS   - restart home dose of lantus 25 U and lispro 15 U sq qac - s/p mult ablations (last one 2/2023), chadsvasc of 6. plan as above. continue eliquis.

## 2023-03-08 NOTE — DISCHARGE NOTE PROVIDER - NSDCQMPCI_CARD_ALL_CORE
Instructions: This plan will send the code FBSE to the PM system.  DO NOT or CHANGE the price. Price (Do Not Change): 0.00 Detail Level: Detailed No

## 2023-03-08 NOTE — DISCHARGE NOTE PROVIDER - NSDCCPCAREPLAN_GEN_ALL_CORE_FT
PRINCIPAL DISCHARGE DIAGNOSIS  Diagnosis: Atrial tachycardia  Assessment and Plan of Treatment: You self converted into a normal cardiac rhythm. Therefore cardioversion was not performed. You will need to continue metoprolol every 6 hours to help control your heart rate. Follow up with your cardiologist on discharge to discuss anti-arryhtmic therapy.      SECONDARY DISCHARGE DIAGNOSES  Diagnosis: Chronic atrial fibrillation  Assessment and Plan of Treatment: Continue Eliquis     PRINCIPAL DISCHARGE DIAGNOSIS  Diagnosis: Atrial tachycardia  Assessment and Plan of Treatment: You self converted into a normal cardiac rhythm. Therefore cardioversion was not performed. You will need to continue metoprolol every 6 hours to help control your heart rate. Follow up with your cardiologist on discharge to discuss anti-arryhtmic therapy.      SECONDARY DISCHARGE DIAGNOSES  Diagnosis: Chronic atrial fibrillation  Assessment and Plan of Treatment: Continue Eliquis to prevent stroke. follow up with your cardiologist for monitoring.

## 2023-03-08 NOTE — DISCHARGE NOTE PROVIDER - NSDCMRMEDTOKEN_GEN_ALL_CORE_FT
acetaminophen 325 mg oral tablet: 2 tab(s) orally every 6 hours, As needed, Temp greater or equal to 38C (100.4F), Mild Pain (1 - 3)  Albuterol (Eqv-ProAir HFA) 90 mcg/inh inhalation aerosol: 2 puff(s) inhaled every 6 hours, As Needed  Breo Ellipta:   Eliquis 5 mg oral tablet: 1 tab(s) orally 2 times a day  famotidine 20 mg oral tablet: 1 tab(s) orally once a day  hydroCHLOROthiazide 25 mg oral tablet: 1 tab(s) orally once a day  insulin lispro 100 units/mL subcutaneous solution: 15 unit(s) subcutaneous 3 times a day  Lantus 100 units/mL subcutaneous solution: 25 unit(s) subcutaneous once a day (at bedtime)  losartan 100 mg oral tablet: 1 tab(s) orally once a day  metoprolol tartrate 50 mg oral tablet: 1 tab(s) orally every 6 hours  pantoprazole 40 mg oral delayed release tablet: 1 tab(s) orally once a day  simvastatin 10 mg oral tablet: 1 tab(s) orally once a day (at bedtime)  spironolactone 50 mg oral tablet: 1 tab(s) orally once a day   acetaminophen 325 mg oral tablet: 2 tab(s) orally every 6 hours, As needed, Temp greater or equal to 38C (100.4F), Mild Pain (1 - 3)  Albuterol (Eqv-ProAir HFA) 90 mcg/inh inhalation aerosol: 2 puff(s) inhaled every 6 hours, As Needed  Breo Ellipta:   Eliquis 5 mg oral tablet: 1 tab(s) orally 2 times a day  famotidine 20 mg oral tablet: 1 tab(s) orally once a day  insulin glargine 100 units/mL subcutaneous solution: 20 unit(s) subcutaneous once a day (at bedtime)  insulin lispro 100 units/mL injectable solution: 2 unit(s) injectable 3 times a day (before meals)   insulin lispro 100 units/mL subcutaneous solution: 15 unit(s) subcutaneous 3 times a day  metoprolol tartrate 50 mg oral tablet: 1 tab(s) orally every 6 hours  pantoprazole 40 mg oral delayed release tablet: 1 tab(s) orally once a day  simvastatin 10 mg oral tablet: 1 tab(s) orally once a day (at bedtime)   acetaminophen 325 mg oral tablet: 2 tab(s) orally every 6 hours, As needed, Temp greater or equal to 38C (100.4F), Mild Pain (1 - 3)  Albuterol (Eqv-ProAir HFA) 90 mcg/inh inhalation aerosol: 2 puff(s) inhaled every 6 hours, As Needed  Breo Ellipta:   Eliquis 5 mg oral tablet: 1 tab(s) orally 2 times a day  famotidine 20 mg oral tablet: 1 tab(s) orally once a day  insulin glargine 100 units/mL subcutaneous solution: 20 unit(s) subcutaneous once a day (at bedtime)  insulin lispro 100 units/mL injectable solution: 2 unit(s) injectable 3 times a day (before meals)   metoprolol tartrate 50 mg oral tablet: 1 tab(s) orally every 6 hours  pantoprazole 40 mg oral delayed release tablet: 1 tab(s) orally once a day  simvastatin 10 mg oral tablet: 1 tab(s) orally once a day (at bedtime)

## 2023-03-08 NOTE — PROGRESS NOTE ADULT - PROBLEM SELECTOR PLAN 2
- s/p mult ablations (last one 2/2023), chadsvasc of 6. plan as above. continue eliquis. - Home regimen lantus 25 U  and lispro 15 U   - recieved lantus 20 U 3/7 as was NPO  - suspect FS will increase as now no longer NPO  -  increase lantus 22 U and add ademalog 2 U sq AC c/w ISS   - goal FS <180

## 2023-03-08 NOTE — PROGRESS NOTE ADULT - PROBLEM SELECTOR PLAN 5
- Hold home brad and losartan outpt f/u if cleared by EP to restart losartan and brad - BP in 110's currently off losartan and metoprolol being added - BP in 110's currently off losartan/HCTZ and metoprolol being added

## 2023-03-08 NOTE — PROGRESS NOTE ADULT - ASSESSMENT
78 year old Female with PMH of T2DM, COPD, and  AFib s/p ablation X3  (2013, 2019 and 2/2/2023) with Dr. Johnson (on Eliquis and Sotalol). Patient presented with c/o worsening SOB/palpitations and found to be in ATach.      Continue telemetry monitoring  Eliquis 5mg Q12H  Sotalol on hold due to prolonged QT > 500ms; Repeat EKG today at 11am  Monitor lytes and replete K>4.0 and Mg>2.0   TTE completed/report pending   DCCV cancelled today (converted to SR at 5:30am)  Appreciate Cardiology recommendations  Management per primary team       78 year old Female with PMH of T2DM, COPD, and  AFib s/p ablation X3  (2013, 2019 and 2/2/2023) with Dr. Johnson (on Eliquis and Sotalol). Patient presented with c/o worsening SOB/palpitations and found to be in ATach.      Continue telemetry monitoring  Eliquis 5mg Q12H  Sotalol on hold due to prolonged QT > 500ms; Repeat EKG today at 11am  Please start Lopressor 50mg Q6H (hold SBP<90 and HR <55)  Monitor lytes and replete K>4.0 and Mg>2.0   TTE completed/report pending   DCCV cancelled today (converted to SR at 5:30am)  Appreciate Cardiology recommendations  Management per primary team

## 2023-03-08 NOTE — DISCHARGE NOTE PROVIDER - HOSPITAL COURSE
78 year old Female with PMH of T2DM, COPD, and  AFib s/p ablation X3  (2013, 2019 and 2/2/2023) with Dr. Johnson (on Eliquis and Sotalol). Patient presented with c/o worsening SOB/palpitations and found to be in ATach. Pt was seen by electrophysiology and planned for dccv. However pt converted to NSR at 5:30am. TTE performed EF 55%. Troponin negative. CXR clear Continue Eliquis 5mg BID.  Lopressor 50mg Q6H started. Sotalol was held due to prolonged QTc>500ms.     Discussed case with Dr. Romero and EP, pt is cleared for discharge home with outpatient cardiology follow up. 78 year old Female with PMH of T2DM, COPD, and  AFib s/p ablation X3  (2013, 2019 and 2/2/2023) with Dr. Johnson (on Eliquis and Sotalol). Patient presented with c/o worsening SOB/palpitations and found to be in ATach. Pt was seen by electrophysiology and planned for dccv. However pt converted to NSR at 5:30am. TTE performed EF 55%. Troponin negative. TSH WNL. CXR clear. Continue Eliquis 5mg BID.  Lopressor 50mg Q6H started. Sotalol was held due to prolonged QTc>500ms.

## 2023-03-08 NOTE — PROGRESS NOTE ADULT - SUBJECTIVE AND OBJECTIVE BOX
[facility] Division of Hospital Medicine  Mandy Romero MD  Pager (M-F, 8A-5P): [phone]  Other Times:  [phone]    Patient is a 78y old  Female who presents with a chief complaint of palpitations, tachycardia (08 Mar 2023 11:08)      SUBJECTIVE / OVERNIGHT EVENTS: Pt in NAD now converted to SR       MEDICATIONS  (STANDING):  apixaban 5 milliGRAM(s) Oral every 12 hours  dextrose 5%. 1000 milliLiter(s) (50 mL/Hr) IV Continuous <Continuous>  dextrose 5%. 1000 milliLiter(s) (100 mL/Hr) IV Continuous <Continuous>  dextrose 50% Injectable 25 Gram(s) IV Push once  dextrose 50% Injectable 12.5 Gram(s) IV Push once  dextrose 50% Injectable 25 Gram(s) IV Push once  glucagon  Injectable 1 milliGRAM(s) IntraMuscular once  insulin glargine Injectable (LANTUS) 20 Unit(s) SubCutaneous at bedtime  insulin lispro (ADMELOG) corrective regimen sliding scale   SubCutaneous three times a day before meals  insulin lispro (ADMELOG) corrective regimen sliding scale   SubCutaneous at bedtime  metoprolol tartrate 50 milliGRAM(s) Oral every 6 hours  pantoprazole    Tablet 40 milliGRAM(s) Oral before breakfast  simvastatin 10 milliGRAM(s) Oral at bedtime    MEDICATIONS  (PRN):  acetaminophen     Tablet .. 650 milliGRAM(s) Oral every 6 hours PRN Temp greater or equal to 38C (100.4F), Mild Pain (1 - 3)  aluminum hydroxide/magnesium hydroxide/simethicone Suspension 30 milliLiter(s) Oral every 4 hours PRN Dyspepsia  dextrose Oral Gel 15 Gram(s) Oral once PRN Blood Glucose LESS THAN 70 milliGRAM(s)/deciliter      CAPILLARY BLOOD GLUCOSE      POCT Blood Glucose.: 110 mg/dL (08 Mar 2023 12:47)  POCT Blood Glucose.: 189 mg/dL (08 Mar 2023 09:41)  POCT Blood Glucose.: 250 mg/dL (08 Mar 2023 00:05)    I&O's Summary      PHYSICAL EXAM:  Vital Signs Last 24 Hrs  T(C): 36.4 (08 Mar 2023 09:57), Max: 37.1 (07 Mar 2023 22:00)  T(F): 97.6 (08 Mar 2023 09:57), Max: 98.8 (07 Mar 2023 22:00)  HR: 65 (08 Mar 2023 09:57) (65 - 139)  BP: 113/57 (08 Mar 2023 09:57) (110/79 - 140/92)  BP(mean): --  RR: 17 (08 Mar 2023 09:57) (16 - 19)  SpO2: 98% (08 Mar 2023 09:57) (96% - 100%)    Parameters below as of 08 Mar 2023 09:57  Patient On (Oxygen Delivery Method): room air      CONSTITUTIONAL: NAD, well-developed, well-groomed  EYES: PERRLA; conjunctiva and sclera clear  ENMT: Moist oral mucosa, no pharyngeal injection or exudates; normal dentition  NECK: Supple, no palpable masses; no thyromegaly  RESPIRATORY: Normal respiratory effort; lungs are clear to auscultation bilaterally  CARDIOVASCULAR: Regular rate and rhythm, normal S1 and S2, no murmur/rub/gallop; No lower extremity edema; Peripheral pulses are 2+ bilaterally  ABDOMEN: Nontender to palpation, normoactive bowel sounds, no rebound/guarding; No hepatosplenomegaly  MUSCULOSKELETAL:  Normal gait; no clubbing or cyanosis of digits; no joint swelling or tenderness to palpation  PSYCH: A+O to person, place, and time; affect appropriate  NEUROLOGY: CN 2-12 are intact and symmetric; no gross sensory deficits   SKIN: No rashes; no palpable lesions    LABS:                        12.3   7.91  )-----------( 226      ( 08 Mar 2023 06:13 )             39.0     03-08    132<L>  |  98  |  12  ----------------------------<  219<H>  4.1   |  24  |  0.65    Ca    9.4      08 Mar 2023 06:13  Mg     2.10     03-08    TPro  7.0  /  Alb  4.1  /  TBili  0.9  /  DBili  <0.2  /  AST  19  /  ALT  13  /  AlkPhos  57  03-08    PT/INR - ( 08 Mar 2023 06:13 )   PT: 18.6 sec;   INR: 1.60 ratio         PTT - ( 07 Mar 2023 16:33 )  PTT:38.9 sec            RADIOLOGY & ADDITIONAL TESTS:  Results Reviewed: < from: Transthoracic Echocardiogram (03.08.23 @ 08:15) >  Fractional short: 27 %  Ejection Fraction (Modified Naik Rule): 55 %  ------------------------------------------------------------------------  OBSERVATIONS:  Mitral Valve: Normal mitral valve. Mild mitral  regurgitation.  Aortic Root: Normal aortic root.  Aortic Valve: Aortic valve not well visualized.  Left Atrium: Normal left atrium.  LA volume index = 17  cc/m2.  Left Ventricle: Endocardium not well visualized; grossly  normal left ventricular systolic function. Septal motion  consitent with a conduction delay. Normal left ventricular  internal dimensions and wall thicknesses. (DT:177 ms).  Right Heart: Normal right atrium. Normal right ventricular  size and function. Normal tricuspid valve. Minimal  tricuspid regurgitation. Normal pulmonic valve.  Pericardium/PleuraNormal pericardium with no pericardial  effusion.  Hemodynamic: Estimated right ventricular systolic pressure  equals 28 mm Hg, assuming right atrial pressure equals 10  mm Hg, consistent with normal pulmonary pressures.  ------------------------------------------------------------------------  CONCLUSIONS:  1. Aortic valve not well visualized.  2. Endocardium not well visualized; grossly normal left  ventricular systolic function. Septal motion consitent with  a conduction delay.  3. Normal right ventricular size and function.  4. Normal tricuspid valve. Minimal tricuspid regurgitation.  *** No previous Echo exam.    < end of copied text >    Imaging Personally Reviewed:  Electrocardiogram Personally Reviewed:    COORDINATION OF CARE:  Care Discussed with Consultants/Other Providers [Y/N]:  Prior or Outpatient Records Reviewed [Y/N]:

## 2023-03-08 NOTE — PROGRESS NOTE ADULT - NS ATTEND AMEND GEN_ALL_CORE FT
78 year old Female with PMH of T2DM, COPD, and  AFib s/p ablation X3  (2013, 2019 and 2/2/2023) with Dr. Johnson (on Eliquis and Sotalol). Patient presented with c/o worsening SOB/palpitations and found to be in ATach. Eliquis 5mg Q12H. Sotalol on hold due to prolonged QT > 500ms; Repeat EKG today at 11am. Please start Lopressor 50mg Q6H (hold SBP<90 and HR <55). TTE completed/report pending. DCCV cancelled today (converted to SR at 5:30am).

## 2023-03-08 NOTE — DISCHARGE NOTE PROVIDER - NSDCFUSCHEDAPPT_GEN_ALL_CORE_FT
Carmen Maxwell  [facility]  ELECTROPH [phone]  Scheduled Appointment: 04/12/2023

## 2023-03-08 NOTE — DISCHARGE NOTE PROVIDER - NSDCFUADDAPPT_GEN_ALL_CORE_FT
Follow up with your primary care physician for further monitoring in 1-2 weeks. Please call to arrange appointment.     Follow up with Dr. Johnson on discharge  Follow up with your primary care physician for further monitoring in 1-2 weeks. Please call to arrange appointment. Follow up with your primary care doctor on restarting your medications such as losartan, hydrochlorothiazide, and spironolactone.     Follow up with Dr. Johnson on discharge

## 2023-03-08 NOTE — PROGRESS NOTE ADULT - ASSESSMENT
79 yo Fw/ afib on AC s/p ablation in 2/2023, HTN, HLD, DM2 on insulin, GERD who presents to the ER w/ 1 day onset of worsening palpitations, chest pain, shoulder pain and shortness of breath.

## 2023-03-08 NOTE — PROGRESS NOTE ADULT - PROBLEM SELECTOR PLAN 1
persistent tachycardia to the 130s likely causing pt's symptoms of palpitations, CP and shortness of breath  - Pt reports hx ablation multiple ablation on sotalol   - Pt reports good compliance to eliquis, will continue. CXR appears clear and pt with no peripheral edema, less likely heart failure exacerbation  - TTE mild MR; EF 55%  - EP c/s for poss ablation now in SR ablation canceled  - EKG with Qtc >500 EP rec hold sotalol and started metoprolol 50mg PO Q6 persistent AT to the 130s likely causing pt's symptoms of palpitations, CP and shortness of breath  - Pt reports hx ablation multiple ablation   - Pt reports good compliance to eliquis, will continue. CXR appears clear and pt with no peripheral edema, less likely heart failure exacerbation  - TTE mild MR; EF 55%  - EP c/s for poss ablation now in SR ablation canceled  - EKG with Qtc >500 EP rec hold sotalol and started metoprolol 50mg PO Q6

## 2023-03-09 ENCOUNTER — TRANSCRIPTION ENCOUNTER (OUTPATIENT)
Age: 78
End: 2023-03-09

## 2023-03-09 VITALS
HEART RATE: 72 BPM | TEMPERATURE: 100 F | RESPIRATION RATE: 18 BRPM | OXYGEN SATURATION: 100 % | SYSTOLIC BLOOD PRESSURE: 136 MMHG | DIASTOLIC BLOOD PRESSURE: 68 MMHG

## 2023-03-09 LAB
ANION GAP SERPL CALC-SCNC: 11 MMOL/L — SIGNIFICANT CHANGE UP (ref 7–14)
BASOPHILS # BLD AUTO: 0.04 K/UL — SIGNIFICANT CHANGE UP (ref 0–0.2)
BASOPHILS NFR BLD AUTO: 0.6 % — SIGNIFICANT CHANGE UP (ref 0–2)
BUN SERPL-MCNC: 16 MG/DL — SIGNIFICANT CHANGE UP (ref 7–23)
CALCIUM SERPL-MCNC: 9.5 MG/DL — SIGNIFICANT CHANGE UP (ref 8.4–10.5)
CHLORIDE SERPL-SCNC: 97 MMOL/L — LOW (ref 98–107)
CO2 SERPL-SCNC: 24 MMOL/L — SIGNIFICANT CHANGE UP (ref 22–31)
CREAT SERPL-MCNC: 0.61 MG/DL — SIGNIFICANT CHANGE UP (ref 0.5–1.3)
EGFR: 91 ML/MIN/1.73M2 — SIGNIFICANT CHANGE UP
EOSINOPHIL # BLD AUTO: 0.26 K/UL — SIGNIFICANT CHANGE UP (ref 0–0.5)
EOSINOPHIL NFR BLD AUTO: 3.8 % — SIGNIFICANT CHANGE UP (ref 0–6)
GLUCOSE SERPL-MCNC: 147 MG/DL — HIGH (ref 70–99)
HCT VFR BLD CALC: 37.7 % — SIGNIFICANT CHANGE UP (ref 34.5–45)
HGB BLD-MCNC: 11.8 G/DL — SIGNIFICANT CHANGE UP (ref 11.5–15.5)
IANC: 3.36 K/UL — SIGNIFICANT CHANGE UP (ref 1.8–7.4)
IMM GRANULOCYTES NFR BLD AUTO: 0.1 % — SIGNIFICANT CHANGE UP (ref 0–0.9)
LYMPHOCYTES # BLD AUTO: 2.49 K/UL — SIGNIFICANT CHANGE UP (ref 1–3.3)
LYMPHOCYTES # BLD AUTO: 36.3 % — SIGNIFICANT CHANGE UP (ref 13–44)
MAGNESIUM SERPL-MCNC: 2.2 MG/DL — SIGNIFICANT CHANGE UP (ref 1.6–2.6)
MCHC RBC-ENTMCNC: 28 PG — SIGNIFICANT CHANGE UP (ref 27–34)
MCHC RBC-ENTMCNC: 31.3 GM/DL — LOW (ref 32–36)
MCV RBC AUTO: 89.5 FL — SIGNIFICANT CHANGE UP (ref 80–100)
MONOCYTES # BLD AUTO: 0.7 K/UL — SIGNIFICANT CHANGE UP (ref 0–0.9)
MONOCYTES NFR BLD AUTO: 10.2 % — SIGNIFICANT CHANGE UP (ref 2–14)
NEUTROPHILS # BLD AUTO: 3.36 K/UL — SIGNIFICANT CHANGE UP (ref 1.8–7.4)
NEUTROPHILS NFR BLD AUTO: 49 % — SIGNIFICANT CHANGE UP (ref 43–77)
NRBC # BLD: 0 /100 WBCS — SIGNIFICANT CHANGE UP (ref 0–0)
NRBC # FLD: 0 K/UL — SIGNIFICANT CHANGE UP (ref 0–0)
PLATELET # BLD AUTO: 232 K/UL — SIGNIFICANT CHANGE UP (ref 150–400)
POTASSIUM SERPL-MCNC: 4.3 MMOL/L — SIGNIFICANT CHANGE UP (ref 3.5–5.3)
POTASSIUM SERPL-SCNC: 4.3 MMOL/L — SIGNIFICANT CHANGE UP (ref 3.5–5.3)
RBC # BLD: 4.21 M/UL — SIGNIFICANT CHANGE UP (ref 3.8–5.2)
RBC # FLD: 13.1 % — SIGNIFICANT CHANGE UP (ref 10.3–14.5)
SODIUM SERPL-SCNC: 132 MMOL/L — LOW (ref 135–145)
WBC # BLD: 6.86 K/UL — SIGNIFICANT CHANGE UP (ref 3.8–10.5)
WBC # FLD AUTO: 6.86 K/UL — SIGNIFICANT CHANGE UP (ref 3.8–10.5)

## 2023-03-09 PROCEDURE — 99239 HOSP IP/OBS DSCHRG MGMT >30: CPT

## 2023-03-09 RX ORDER — INSULIN LISPRO 100/ML
15 VIAL (ML) SUBCUTANEOUS
Qty: 0 | Refills: 0 | DISCHARGE

## 2023-03-09 RX ORDER — HYDROCHLOROTHIAZIDE 25 MG
1 TABLET ORAL
Qty: 0 | Refills: 0 | DISCHARGE

## 2023-03-09 RX ORDER — SPIRONOLACTONE 25 MG/1
1 TABLET, FILM COATED ORAL
Qty: 0 | Refills: 0 | DISCHARGE

## 2023-03-09 RX ORDER — INSULIN GLARGINE 100 [IU]/ML
20 INJECTION, SOLUTION SUBCUTANEOUS
Qty: 5 | Refills: 0
Start: 2023-03-09 | End: 2023-04-07

## 2023-03-09 RX ORDER — INSULIN GLARGINE 100 [IU]/ML
25 INJECTION, SOLUTION SUBCUTANEOUS
Qty: 0 | Refills: 0 | DISCHARGE

## 2023-03-09 RX ORDER — INSULIN LISPRO 100/ML
2 VIAL (ML) SUBCUTANEOUS
Qty: 5 | Refills: 0
Start: 2023-03-09 | End: 2023-04-07

## 2023-03-09 RX ORDER — LOSARTAN POTASSIUM 100 MG/1
1 TABLET, FILM COATED ORAL
Qty: 0 | Refills: 0 | DISCHARGE

## 2023-03-09 RX ADMIN — APIXABAN 5 MILLIGRAM(S): 2.5 TABLET, FILM COATED ORAL at 12:03

## 2023-03-09 RX ADMIN — Medication 1: at 08:57

## 2023-03-09 RX ADMIN — Medication 3: at 13:11

## 2023-03-09 RX ADMIN — Medication 50 MILLIGRAM(S): at 12:04

## 2023-03-09 RX ADMIN — PANTOPRAZOLE SODIUM 40 MILLIGRAM(S): 20 TABLET, DELAYED RELEASE ORAL at 05:07

## 2023-03-09 RX ADMIN — Medication 50 MILLIGRAM(S): at 05:07

## 2023-03-09 RX ADMIN — Medication 2 UNIT(S): at 13:11

## 2023-03-09 RX ADMIN — Medication 2 UNIT(S): at 08:58

## 2023-03-09 NOTE — PHARMACOTHERAPY INTERVENTION NOTE - COMMENTS
Discharge medications reviewed with the patient. Outpatient medication schedule was discussed in detail including: medication name, indication, dose, administration times, treatment duration, side effects and special instructions. Patient questions and concerns were answered and addressed. Patient seemed a little confused but stated she understood. Patient provided with educational handout.     Jessica Sun, PharmD, Clinical Pharmacy Specialist, [phone]

## 2023-03-09 NOTE — PROGRESS NOTE ADULT - PROBLEM SELECTOR PLAN 6
eliquis for dvt ppx, diabetic    Dispo : home with outpt f/u 3/16 and 3/17with EP and PCP Stacy Howell. d/w daughter in regards to adjust insulin according to ISS to home doses of 15U     discharge 40 min
eliquis for dvt ppx, diabetic

## 2023-03-09 NOTE — DISCHARGE NOTE NURSING/CASE MANAGEMENT/SOCIAL WORK - NSDCFUADDAPPT_GEN_ALL_CORE_FT
Follow up with your primary care physician for further monitoring in 1-2 weeks. Please call to arrange appointment. Follow up with your primary care doctor on restarting your medications such as losartan, hydrochlorothiazide, and spironolactone.     Follow up with Dr. Johnson on discharge

## 2023-03-09 NOTE — PROGRESS NOTE ADULT - NS ATTEND AMEND GEN_ALL_CORE FT
78 year old Female with PMH of T2DM, COPD, and  AFib s/p ablation X3  (2013, 2019 and 2/2/2023) with Dr. Johnson, maintained on Eliquis and Sotalol presenting with c/o SOB/palpitations. This is the third admission since her last ablation.  Found to be in ATach. DCCV cancelled after patient self converted to sinus rhythm. Sotalol discontinued due to prolonged QTc. Started on metoprolol 50mg q 6 hours. Tolerating well.   ECHO: Mild MR. Normal left atrium. Grossly normal LV systolic function with LVEF 55%. Continue Eliquis 5mg Q12H. Continue metoprolol 50mg q 6 hours. Patient will follow-up with her EP attending at [facility]. The plan is for repeat ablation. Patient aware and agrees.

## 2023-03-09 NOTE — PROGRESS NOTE ADULT - ASSESSMENT
78 year old Female with PMH of T2DM, COPD, and  AFib s/p ablation X3  (2013, 2019 and 2/2/2023) with Dr. Johnson, maintained on Eliquis and Sotalol presenting with c/o SOB/palpitations. This is the third admission since her last ablation.  Found to be in ATach. DCCV cancelled after patient self converted to sinus rhythm. Sotalol discontinued due to prolonged QTc. Started on metoprolol 50mg q 6 hours. Tolerating well.   ECHO: Mild MR. Normal left atrium. Grossly normal LV systolic function with LVEF 55%.   Plan:   Continue Eliquis 5mg Q12H  Continue metoprolol 50mg q 6 hours.   Encourage ambulation.  Monitor lytes and replete K>4.0 and Mg>2.0   Patient will follow-up with her EP attending at [facility]. The plan is for repeat ablation. Patient aware and agrees.

## 2023-03-09 NOTE — PROVIDER CONTACT NOTE (OTHER) - ACTION/TREATMENT ORDERED:
no new orders given at this time
no new orders given at this time
notify for heart rate sustaining greater than 140
no new orders given at this time

## 2023-03-09 NOTE — PROGRESS NOTE ADULT - REASON FOR ADMISSION
palpitations, tachycardia

## 2023-03-09 NOTE — PROGRESS NOTE ADULT - SUBJECTIVE AND OBJECTIVE BOX
Patient laying comfortably in bed.   Denies chest pain, shortness of breath, palpitations or lightheadedness.   Off sotalol due to QTc prolongation. Tolerating metoprolol.   Does have occasional dizziness when ambulating but states this is longstanding, predating the metoprolol.      Vital Signs Last 24 Hrs  T(C): 36.4 (09 Mar 2023 12:00), Max: 36.8 (08 Mar 2023 22:05)  T(F): 97.6 (09 Mar 2023 12:00), Max: 98.3 (08 Mar 2023 22:05)  HR: 68 (09 Mar 2023 12:00) (58 - 88)  BP: 118/62 (09 Mar 2023 12:00) (112/58 - 124/85)  BP(mean): --  RR: 17 (09 Mar 2023 12:00) (16 - 18)  SpO2: 100% (09 Mar 2023 12:00) (97% - 100%)    Parameters below as of 09 Mar 2023 12:00  Patient On (Oxygen Delivery Method): room air        Telemetry: Normal sinus rhythm 60's. Sinus denver high 50's overnight.  MEDICATIONS  (STANDING):  apixaban 5 milliGRAM(s) Oral every 12 hours  dextrose 5%. 1000 milliLiter(s) (50 mL/Hr) IV Continuous <Continuous>  dextrose 5%. 1000 milliLiter(s) (100 mL/Hr) IV Continuous <Continuous>  dextrose 50% Injectable 25 Gram(s) IV Push once  dextrose 50% Injectable 12.5 Gram(s) IV Push once  dextrose 50% Injectable 25 Gram(s) IV Push once  glucagon  Injectable 1 milliGRAM(s) IntraMuscular once  insulin glargine Injectable (LANTUS) 22 Unit(s) SubCutaneous at bedtime  insulin lispro (ADMELOG) corrective regimen sliding scale   SubCutaneous three times a day before meals  insulin lispro (ADMELOG) corrective regimen sliding scale   SubCutaneous at bedtime  insulin lispro Injectable (ADMELOG) 2 Unit(s) SubCutaneous three times a day before meals  metoprolol tartrate 50 milliGRAM(s) Oral every 6 hours  pantoprazole    Tablet 40 milliGRAM(s) Oral before breakfast  simvastatin 10 milliGRAM(s) Oral at bedtime    MEDICATIONS  (PRN):  acetaminophen     Tablet .. 650 milliGRAM(s) Oral every 6 hours PRN Temp greater or equal to 38C (100.4F), Mild Pain (1 - 3)  aluminum hydroxide/magnesium hydroxide/simethicone Suspension 30 milliLiter(s) Oral every 4 hours PRN Dyspepsia  dextrose Oral Gel 15 Gram(s) Oral once PRN Blood Glucose LESS THAN 70 milliGRAM(s)/deciliter          Physical exam:   Gen- well developed well nourished NAD  Resp- clear to auscultation. No wheezing, rales or rhonchi  CV- S1 and S2 RRR. No murmurs, gallops or rubs  ABD- soft nontender + bowel sounds  EXT- no edema no calf tenderness. Mild venous stasis changes lower legs.   Neuro- grossly nonfocal                            11.8   6.86  )-----------( 232      ( 09 Mar 2023 07:01 )             37.7     PT/INR - ( 08 Mar 2023 06:13 )   PT: 18.6 sec;   INR: 1.60 ratio         PTT - ( 07 Mar 2023 16:33 )  PTT:38.9 sec  03-09    132<L>  |  97<L>  |  16  ----------------------------<  147<H>  4.3   |  24  |  0.61    Ca    9.5      09 Mar 2023 07:01  Mg     2.20     03-09    TPro  7.0  /  Alb  4.1  /  TBili  0.9  /  DBili  <0.2  /  AST  19  /  ALT  13  /  AlkPhos  57  03-08      ECHOCARDIOGRAM;    < from: Transthoracic Echocardiogram (03.08.23 @ 08:15) >  Patient name: ARVIN KNOWLES  YOB: 1945   Age: 78 (F)   MR#: [id number]  Study Date: 3/8/2023  [facility]  Study quality: Technically good  Referring Physician: Ruht Gómez MD  Blood Pressure: 123/73 mmHg  Height: 168 cm  Weight: 71 kg  BSA: 1.8 m2  ------------------------------------------------------------------------  PROCEDURE: Transthoracic echocardiogram with 2-D, M-Mode  and complete spectral and color flow Doppler.  INDICATION: Abnormal electrocardiogram (ECG) (EKG) (R94.31)  ------------------------------------------------------------------------  DIMENSIONS:  Dimensions:     Normal Values:  LA:     3.3 cm    2.0 - 4.0 cm  Ao:     3.1 cm    2.0 - 3.8 cm  SEPTUM: 1.1 cm    0.6 - 1.2 cm  PWT:    1.0 cm    0.6 - 1.1 cm  LVIDd:  4.5 cm    3.0 - 5.6 cm  LVIDs:  3.3 cm    1.8 - 4.0 cm  Derived Variables:  LVMI: 91 g/m2  RWT: 0.44  Fractional short: 27 %  Ejection Fraction (Modified Naik Rule): 55 %  ------------------------------------------------------------------------  OBSERVATIONS:  Mitral Valve: Normal mitral valve. Mild mitral  regurgitation.  Aortic Root: Normal aortic root.  Aortic Valve: Aortic valve not well visualized.  Left Atrium: Normal left atrium.  LA volume index = 17  cc/m2.  Left Ventricle: Endocardium not well visualized; grossly  normal left ventricular systolic function. Septal motion  consitent with a conduction delay. Normal left ventricular  internal dimensions and wall thicknesses. (DT:177 ms).  Right Heart: Normal right atrium. Normal right ventricular  size and function. Normal tricuspid valve. Minimal  tricuspid regurgitation. Normal pulmonic valve.  Pericardium/PleuraNormal pericardium with no pericardial  effusion.  Hemodynamic: Estimated right ventricular systolic pressure  equals 28 mm Hg, assuming right atrial pressure equals 10  mm Hg, consistent with normal pulmonary pressures.  ------------------------------------------------------------------------  CONCLUSIONS:  1. Aortic valve not well visualized.  2. Endocardium not well visualized; grossly normal left  ventricular systolic function. Septal motion consitent with  a conduction delay.  3. Normal right ventricular size and function.  4. Normal tricuspid valve. Minimal tricuspid regurgitation.  *** No previous Echo exam.  ------------------------------------------------------------------------  Confirmed on  3/8/2023 - 13:44:00 by Aisha Garcia MD

## 2023-03-09 NOTE — PROGRESS NOTE ADULT - PROBLEM SELECTOR PLAN 2
- Home regimen lantus 25 U  and lispro 15 U   - recieved lantus 20 U 3/7 as was NPO  - suspect FS will increase as now no longer NPO  -  c/w lantus 22 U and add ademalog 2 U sq AC c/w ISS   - goal FS <180

## 2023-03-09 NOTE — PROVIDER CONTACT NOTE (OTHER) - ASSESSMENT
T 97.3. P 60. /54. R 18. O2 97%. patient asymptomatic.
T 97.8. P 137. /70. R 16. O2 96%. patient asymptomatic.
T 98.8. P 130. /98. R 16. O2 96%. patient asymptomatic.
T 98.3. P 61. /59. R 17. O2 97%. patient asymptomatic.

## 2023-03-09 NOTE — PROGRESS NOTE ADULT - SUBJECTIVE AND OBJECTIVE BOX
[facility] Division of Hospital Medicine  Mandy Romero MD  Pager (M-F, 8A-5P): [phone]  Other Times:  [phone]    Patient is a 78y old  Female who presents with a chief complaint of palpitations, tachycardia (08 Mar 2023 15:22)      SUBJECTIVE / OVERNIGHT EVENTS: Pt in NAD states no further palpitation tolerated BB and HR controlled       MEDICATIONS  (STANDING):  apixaban 5 milliGRAM(s) Oral every 12 hours  dextrose 5%. 1000 milliLiter(s) (50 mL/Hr) IV Continuous <Continuous>  dextrose 5%. 1000 milliLiter(s) (100 mL/Hr) IV Continuous <Continuous>  dextrose 50% Injectable 25 Gram(s) IV Push once  dextrose 50% Injectable 12.5 Gram(s) IV Push once  dextrose 50% Injectable 25 Gram(s) IV Push once  glucagon  Injectable 1 milliGRAM(s) IntraMuscular once  insulin glargine Injectable (LANTUS) 22 Unit(s) SubCutaneous at bedtime  insulin lispro (ADMELOG) corrective regimen sliding scale   SubCutaneous three times a day before meals  insulin lispro (ADMELOG) corrective regimen sliding scale   SubCutaneous at bedtime  insulin lispro Injectable (ADMELOG) 2 Unit(s) SubCutaneous three times a day before meals  metoprolol tartrate 50 milliGRAM(s) Oral every 6 hours  pantoprazole    Tablet 40 milliGRAM(s) Oral before breakfast  simvastatin 10 milliGRAM(s) Oral at bedtime    MEDICATIONS  (PRN):  acetaminophen     Tablet .. 650 milliGRAM(s) Oral every 6 hours PRN Temp greater or equal to 38C (100.4F), Mild Pain (1 - 3)  aluminum hydroxide/magnesium hydroxide/simethicone Suspension 30 milliLiter(s) Oral every 4 hours PRN Dyspepsia  dextrose Oral Gel 15 Gram(s) Oral once PRN Blood Glucose LESS THAN 70 milliGRAM(s)/deciliter      CAPILLARY BLOOD GLUCOSE      POCT Blood Glucose.: 270 mg/dL (09 Mar 2023 13:02)  POCT Blood Glucose.: 156 mg/dL (09 Mar 2023 08:56)  POCT Blood Glucose.: 211 mg/dL (08 Mar 2023 22:05)  POCT Blood Glucose.: 171 mg/dL (08 Mar 2023 18:03)    I&O's Summary    09 Mar 2023 07:01  -  09 Mar 2023 13:42  --------------------------------------------------------  IN: 200 mL / OUT: 0 mL / NET: 200 mL        PHYSICAL EXAM:  Vital Signs Last 24 Hrs  T(C): 36.4 (09 Mar 2023 12:00), Max: 36.8 (08 Mar 2023 22:05)  T(F): 97.6 (09 Mar 2023 12:00), Max: 98.3 (08 Mar 2023 22:05)  HR: 68 (09 Mar 2023 12:00) (58 - 88)  BP: 118/62 (09 Mar 2023 12:00) (112/58 - 124/85)  BP(mean): --  RR: 17 (09 Mar 2023 12:00) (16 - 18)  SpO2: 100% (09 Mar 2023 12:00) (97% - 100%)    Parameters below as of 09 Mar 2023 12:00  Patient On (Oxygen Delivery Method): room air      CONSTITUTIONAL: NAD  EYES: conjunctiva and sclera clear  NECK: Supple  RESPIRATORY: Normal respiratory effort; lungs are clear to auscultation bilaterally  CARDIOVASCULAR: Regular rate and rhythm, normal S1 and S2,  ABDOMEN: Nontender to palpation, normoactive bowel sounds, no rebound/guarding;  PSYCH: A+O to person, place, and time; affect appropriate  NEUROLOGY: CN 2-12 are intact and symmetric; no gross sensory deficits       LABS:                        11.8   6.86  )-----------( 232      ( 09 Mar 2023 07:01 )             37.7     03-09    132<L>  |  97<L>  |  16  ----------------------------<  147<H>  4.3   |  24  |  0.61    Ca    9.5      09 Mar 2023 07:01  Mg     2.20     03-09    TPro  7.0  /  Alb  4.1  /  TBili  0.9  /  DBili  <0.2  /  AST  19  /  ALT  13  /  AlkPhos  57  03-08    PT/INR - ( 08 Mar 2023 06:13 )   PT: 18.6 sec;   INR: 1.60 ratio         PTT - ( 07 Mar 2023 16:33 )  PTT:38.9 sec            RADIOLOGY & ADDITIONAL TESTS:  Results Reviewed:   Imaging Personally Reviewed:  Electrocardiogram Personally Reviewed:    COORDINATION OF CARE:  Care Discussed with Consultants/Other Providers [Y/N]:  Prior or Outpatient Records Reviewed [Y/N]:

## 2023-03-09 NOTE — DISCHARGE NOTE NURSING/CASE MANAGEMENT/SOCIAL WORK - PATIENT PORTAL LINK FT
You can access the FollowMyHealth Patient Portal offered by [facility] by registering at the following website: [email or web address]. By joining [employer]’s FollowMyHealth portal, you will also be able to view your health information using other applications (apps) compatible with our system.

## 2023-03-09 NOTE — DISCHARGE NOTE NURSING/CASE MANAGEMENT/SOCIAL WORK - NSDCPEELIQUISFU_GEN_ALL_CORE
Communicable/Infectious
Go for blood tests as directed. Your doctor will do lab tests at regular visits to monitor the effects of this medicine. Please follow up with your doctor and keep your health care provider appointments.

## 2023-03-29 PROBLEM — Z00.00 ENCOUNTER FOR PREVENTIVE HEALTH EXAMINATION: Status: ACTIVE | Noted: 2023-03-29

## 2023-04-12 ENCOUNTER — APPOINTMENT (OUTPATIENT)
Dept: ELECTROPHYSIOLOGY | Facility: CLINIC | Age: 78
End: 2023-04-12

## 2023-04-26 PROBLEM — J45.909 UNSPECIFIED ASTHMA, UNCOMPLICATED: Chronic | Status: ACTIVE | Noted: 2023-03-07

## 2023-04-26 PROBLEM — I10 ESSENTIAL (PRIMARY) HYPERTENSION: Chronic | Status: ACTIVE | Noted: 2023-03-07

## 2023-04-26 PROBLEM — I50.9 HEART FAILURE, UNSPECIFIED: Chronic | Status: ACTIVE | Noted: 2023-03-07

## 2023-05-24 ENCOUNTER — APPOINTMENT (OUTPATIENT)
Dept: SURGERY | Facility: CLINIC | Age: 78
End: 2023-05-24
Payer: MEDICARE

## 2023-05-24 VITALS
HEIGHT: 66 IN | DIASTOLIC BLOOD PRESSURE: 89 MMHG | HEART RATE: 68 BPM | SYSTOLIC BLOOD PRESSURE: 154 MMHG | WEIGHT: 162 LBS | BODY MASS INDEX: 26.03 KG/M2

## 2023-05-24 DIAGNOSIS — Z87.19 PERSONAL HISTORY OF OTHER DISEASES OF THE DIGESTIVE SYSTEM: ICD-10-CM

## 2023-05-24 DIAGNOSIS — Z87.09 PERSONAL HISTORY OF OTHER DISEASES OF THE RESPIRATORY SYSTEM: ICD-10-CM

## 2023-05-24 DIAGNOSIS — Z86.79 PERSONAL HISTORY OF OTHER DISEASES OF THE CIRCULATORY SYSTEM: ICD-10-CM

## 2023-05-24 DIAGNOSIS — Z86.39 PERSONAL HISTORY OF OTHER ENDOCRINE, NUTRITIONAL AND METABOLIC DISEASE: ICD-10-CM

## 2023-05-24 PROBLEM — I48.20 CHRONIC ATRIAL FIBRILLATION, UNSPECIFIED: Chronic | Status: ACTIVE | Noted: 2023-03-07

## 2023-05-24 PROBLEM — E78.5 HYPERLIPIDEMIA, UNSPECIFIED: Chronic | Status: ACTIVE | Noted: 2023-03-07

## 2023-05-24 PROBLEM — E11.9 TYPE 2 DIABETES MELLITUS WITHOUT COMPLICATIONS: Chronic | Status: ACTIVE | Noted: 2023-03-07

## 2023-05-24 PROCEDURE — 99204 OFFICE O/P NEW MOD 45 MIN: CPT

## 2023-05-24 RX ORDER — DEXAMETHASONE 1 MG/1
1 TABLET ORAL
Qty: 1 | Refills: 0 | Status: ACTIVE | COMMUNITY
Start: 2023-05-24 | End: 1900-01-01

## 2023-05-24 NOTE — PHYSICAL EXAM
[Respiratory Effort] : normal respiratory effort [Normal Heart Sounds] : normal heart sounds [No Rash or Lesion] : No rash or lesion [Alert] : alert [Oriented to Person] : oriented to person [Oriented to Place] : oriented to place [Oriented to Time] : oriented to time [Calm] : calm [de-identified] : NAD [de-identified] : EOM intact [de-identified] : The neck appears flat [de-identified] : Abdomen - There is a lower midline scar.  The abdomen is soft, mild upper abdominal tenderness without guarding, non-distended.  There is mild bilateral CVA tenderness [de-identified] : URIOSTEGUI x 4 [de-identified] : slight memory loss

## 2023-05-25 ENCOUNTER — NON-APPOINTMENT (OUTPATIENT)
Age: 78
End: 2023-05-25

## 2023-05-25 LAB — ACTH SER-ACNC: 27.2 PG/ML

## 2023-05-26 LAB — CORTIS SERPL-MCNC: 1.1 UG/DL

## 2023-05-31 LAB
METANEPHRINE, PL: 37.7 PG/ML
NORMETANEPHRINE, PL: 261.9 PG/ML

## 2023-06-03 LAB — DHEA-SULFATE, SERUM: 13 UG/DL

## 2023-06-06 ENCOUNTER — NON-APPOINTMENT (OUTPATIENT)
Age: 78
End: 2023-06-06

## 2023-06-07 LAB — DEXAMETHASONE LEVEL: 658 NG/DL

## 2023-07-21 ENCOUNTER — APPOINTMENT (OUTPATIENT)
Dept: CT IMAGING | Facility: CLINIC | Age: 78
End: 2023-07-21
Payer: MEDICARE

## 2023-07-21 ENCOUNTER — OUTPATIENT (OUTPATIENT)
Dept: OUTPATIENT SERVICES | Facility: HOSPITAL | Age: 78
LOS: 1 days | End: 2023-07-21
Payer: MEDICARE

## 2023-07-21 DIAGNOSIS — Z98.890 OTHER SPECIFIED POSTPROCEDURAL STATES: Chronic | ICD-10-CM

## 2023-07-21 DIAGNOSIS — E27.8 OTHER SPECIFIED DISORDERS OF ADRENAL GLAND: ICD-10-CM

## 2023-07-21 PROCEDURE — 74170 CT ABD WO CNTRST FLWD CNTRST: CPT

## 2023-07-21 PROCEDURE — 74170 CT ABD WO CNTRST FLWD CNTRST: CPT | Mod: 26,MH

## 2023-07-26 ENCOUNTER — APPOINTMENT (OUTPATIENT)
Dept: SURGERY | Facility: CLINIC | Age: 78
End: 2023-07-26
Payer: MEDICARE

## 2023-07-26 DIAGNOSIS — E27.8 OTHER SPECIFIED DISORDERS OF ADRENAL GLAND: ICD-10-CM

## 2023-07-26 DIAGNOSIS — E26.9 HYPERALDOSTERONISM, UNSPECIFIED: ICD-10-CM

## 2023-07-26 DIAGNOSIS — E26.09 OTHER PRIMARY HYPERALDOSTERONISM: ICD-10-CM

## 2023-07-26 PROCEDURE — 99214 OFFICE O/P EST MOD 30 MIN: CPT

## 2023-07-26 NOTE — PHYSICAL EXAM
[Respiratory Effort] : normal respiratory effort [Normal Heart Sounds] : normal heart sounds [No Rash or Lesion] : No rash or lesion [Alert] : alert [Oriented to Person] : oriented to person [Oriented to Place] : oriented to place [Oriented to Time] : oriented to time [Calm] : calm [de-identified] : NAD [de-identified] : EOM intact [de-identified] : The neck appears flat [de-identified] : Abdomen - There is a lower midline scar.  The abdomen is soft, mild upper abdominal tenderness without guarding, non-distended.  There is mild bilateral CVA tenderness [de-identified] : URIOSTEGUI x 4 [de-identified] : slight memory loss

## 2025-03-11 ENCOUNTER — APPOINTMENT (OUTPATIENT)
Dept: OPHTHALMOLOGY | Facility: CLINIC | Age: 80
End: 2025-03-11
Payer: MEDICARE

## 2025-03-11 ENCOUNTER — NON-APPOINTMENT (OUTPATIENT)
Age: 80
End: 2025-03-11

## 2025-03-11 PROCEDURE — 92083 EXTENDED VISUAL FIELD XM: CPT

## 2025-03-11 PROCEDURE — 92133 CPTRZD OPH DX IMG PST SGM ON: CPT

## 2025-03-11 PROCEDURE — 99204 OFFICE O/P NEW MOD 45 MIN: CPT | Mod: 25

## 2025-04-14 ENCOUNTER — DOCTOR'S OFFICE (OUTPATIENT)
Facility: LOCATION | Age: 80
Setting detail: OPHTHALMOLOGY
End: 2025-04-14
Payer: MEDICARE

## 2025-04-14 ENCOUNTER — RX ONLY (RX ONLY)
Age: 80
End: 2025-04-14

## 2025-04-14 DIAGNOSIS — E10.3291: ICD-10-CM

## 2025-04-14 DIAGNOSIS — H25.13: ICD-10-CM

## 2025-04-14 DIAGNOSIS — H40.013: ICD-10-CM

## 2025-04-14 DIAGNOSIS — H00.022: ICD-10-CM

## 2025-04-14 PROBLEM — H01.001 BLEPHARITIS; RIGHT UPPER LID, LEFT UPPER LID: Status: ACTIVE | Noted: 2025-04-14

## 2025-04-14 PROBLEM — H11.151 PINGUECULA; RIGHT EYE: Status: ACTIVE | Noted: 2025-04-14

## 2025-04-14 PROBLEM — H01.004 BLEPHARITIS; RIGHT UPPER LID, LEFT UPPER LID: Status: ACTIVE | Noted: 2025-04-14

## 2025-04-14 PROCEDURE — 99204 OFFICE O/P NEW MOD 45 MIN: CPT | Performed by: OPHTHALMOLOGY

## 2025-04-14 ASSESSMENT — REFRACTION_CURRENTRX
OD_ADD: +3.00
OS_CYLINDER: +0.50
OS_SPHERE: -0.25
OS_AXIS: 161
OD_VPRISM_DIRECTION: PROGS
OD_CYLINDER: +1.00
OD_OVR_VA: 20/
OS_VPRISM_DIRECTION: PROGS
OS_OVR_VA: 20/
OD_SPHERE: +0.25
OS_ADD: +3.00
OD_AXIS: 021

## 2025-04-14 ASSESSMENT — LID EXAM ASSESSMENTS
OD_BLEPHARITIS: RUL T 1+
OS_BLEPHARITIS: LUL T 1+
OD_COMMENTS: MISSING/SHORTENED GLANDS
OD_MEIBOMITIS: RLL

## 2025-04-14 ASSESSMENT — REFRACTION_AUTOREFRACTION
OS_AXIS: 035
OD_AXIS: 021
OS_CYLINDER: +1.50
OD_CYLINDER: +1.25
OD_SPHERE: -0.50
OS_SPHERE: -2.00

## 2025-04-14 ASSESSMENT — KERATOMETRY
OS_K2POWER_DIOPTERS: 44.50
OS_AXISANGLE_DEGREES: 090
OS_K1POWER_DIOPTERS: 44.50
OD_AXISANGLE_DEGREES: 025
OD_K2POWER_DIOPTERS: 44.75
OD_K1POWER_DIOPTERS: 44.00

## 2025-04-14 ASSESSMENT — CONFRONTATIONAL VISUAL FIELD TEST (CVF)
OD_FINDINGS: FULL
OS_FINDINGS: FULL

## 2025-04-14 ASSESSMENT — TONOMETRY
OS_IOP_MMHG: 16
OD_IOP_MMHG: 18

## 2025-04-14 ASSESSMENT — VISUAL ACUITY
OD_BCVA: 20/100
OS_BCVA: 20/40

## 2025-04-14 ASSESSMENT — REFRACTION_MANIFEST
OD_CYLINDER: +1.00
OD_SPHERE: +0.50
OD_VA1: 20/30-
OD_AXIS: 020
OS_AXIS: 035
OS_CYLINDER: +1.50
OS_VA1: 20/50-2
OS_SPHERE: -1.25

## 2025-05-27 ENCOUNTER — DOCTOR'S OFFICE (OUTPATIENT)
Facility: LOCATION | Age: 80
Setting detail: OPHTHALMOLOGY
End: 2025-05-27
Payer: MEDICARE

## 2025-05-27 DIAGNOSIS — H25.12: ICD-10-CM

## 2025-05-27 DIAGNOSIS — H25.13: ICD-10-CM

## 2025-05-27 PROBLEM — H25.11 CATARACT SENILE NUCLEAR SCLEROSIS; RIGHT EYE, LEFT EYE, BOTH EYES: Status: ACTIVE | Noted: 2025-05-27

## 2025-05-27 PROCEDURE — 92136 OPHTHALMIC BIOMETRY: CPT | Mod: LT | Performed by: OPHTHALMOLOGY

## 2025-05-27 PROCEDURE — 99213 OFFICE O/P EST LOW 20 MIN: CPT | Performed by: OPHTHALMOLOGY

## 2025-05-27 ASSESSMENT — REFRACTION_AUTOREFRACTION
OD_SPHERE: -0.50
OD_AXIS: 021
OS_AXIS: 035
OS_CYLINDER: +1.50
OS_SPHERE: -2.00
OD_CYLINDER: +1.25

## 2025-05-27 ASSESSMENT — REFRACTION_MANIFEST
OS_VA1: 20/50-2
OS_CYLINDER: +1.50
OD_VA1: 20/30-
OD_AXIS: 020
OS_AXIS: 035
OS_SPHERE: -1.25
OD_SPHERE: +0.50
OD_CYLINDER: +1.00

## 2025-05-27 ASSESSMENT — REFRACTION_CURRENTRX
OS_VPRISM_DIRECTION: PROGS
OD_SPHERE: +0.25
OD_OVR_VA: 20/
OS_CYLINDER: +0.50
OD_ADD: +3.00
OS_OVR_VA: 20/
OD_VPRISM_DIRECTION: PROGS
OD_CYLINDER: +1.00
OS_AXIS: 161
OS_SPHERE: -0.25
OD_AXIS: 021
OS_ADD: +3.00

## 2025-05-27 ASSESSMENT — KERATOMETRY
OS_AXISANGLE_DEGREES: 090
OD_K2POWER_DIOPTERS: 44.75
OS_K1POWER_DIOPTERS: 44.50
OD_K1POWER_DIOPTERS: 44.00
OD_AXISANGLE_DEGREES: 025
OS_K2POWER_DIOPTERS: 44.50

## 2025-05-27 ASSESSMENT — VISUAL ACUITY
OD_BCVA: 20/150
OS_BCVA: 20/40

## 2025-06-12 ENCOUNTER — AMBULATORY SURGERY CENTER (OUTPATIENT)
Dept: URBAN - METROPOLITAN AREA SURGERY 7 | Facility: SURGERY | Age: 80
Setting detail: OPHTHALMOLOGY
End: 2025-06-12
Payer: MEDICARE

## 2025-06-12 DIAGNOSIS — H25.12: ICD-10-CM

## 2025-06-12 PROCEDURE — 66984 XCAPSL CTRC RMVL W/O ECP: CPT | Mod: LT | Performed by: OPHTHALMOLOGY

## 2025-06-13 ENCOUNTER — DOCTOR'S OFFICE (OUTPATIENT)
Facility: LOCATION | Age: 80
Setting detail: OPHTHALMOLOGY
End: 2025-06-13
Payer: MEDICARE

## 2025-06-13 DIAGNOSIS — H25.11: ICD-10-CM

## 2025-06-13 DIAGNOSIS — Z96.1: ICD-10-CM

## 2025-06-13 PROCEDURE — 99024 POSTOP FOLLOW-UP VISIT: CPT | Performed by: OPHTHALMOLOGY

## 2025-06-13 PROCEDURE — 92136 OPHTHALMIC BIOMETRY: CPT | Mod: RT | Performed by: OPHTHALMOLOGY

## 2025-06-13 ASSESSMENT — REFRACTION_CURRENTRX
OD_OVR_VA: 20/
OS_AXIS: 161
OD_AXIS: 021
OS_CYLINDER: +0.50
OS_OVR_VA: 20/
OS_SPHERE: -0.25
OD_VPRISM_DIRECTION: PROGS
OD_CYLINDER: +1.00
OS_VPRISM_DIRECTION: PROGS
OD_ADD: +3.00
OS_ADD: +3.00
OD_SPHERE: +0.25

## 2025-06-13 ASSESSMENT — REFRACTION_AUTOREFRACTION
OD_CYLINDER: +1.25
OD_AXIS: 027
OD_SPHERE: -0.25
OS_SPHERE: ERR

## 2025-06-13 ASSESSMENT — CONFRONTATIONAL VISUAL FIELD TEST (CVF)
OD_FINDINGS: FULL
OS_FINDINGS: FULL

## 2025-06-13 ASSESSMENT — REFRACTION_MANIFEST
OS_AXIS: 035
OD_CYLINDER: +1.00
OD_SPHERE: +0.50
OS_VA1: 20/50-2
OS_CYLINDER: +1.50
OD_VA1: 20/30-
OD_AXIS: 020
OS_SPHERE: -1.25

## 2025-06-13 ASSESSMENT — KERATOMETRY
OD_K2POWER_DIOPTERS: 44.75
OD_K1POWER_DIOPTERS: 44.75
OD_AXISANGLE_DEGREES: 090
OS_K1POWER_DIOPTERS: 44.50
OS_AXISANGLE_DEGREES: 161
OS_K2POWER_DIOPTERS: 45.50

## 2025-06-13 ASSESSMENT — VISUAL ACUITY
OS_BCVA: 20/50+1
OD_BCVA: 20/150

## 2025-06-13 ASSESSMENT — TONOMETRY: OS_IOP_MMHG: 16

## 2025-06-18 ENCOUNTER — DOCTOR'S OFFICE (OUTPATIENT)
Facility: LOCATION | Age: 80
Setting detail: OPHTHALMOLOGY
End: 2025-06-18
Payer: MEDICARE

## 2025-06-18 DIAGNOSIS — Z96.1: ICD-10-CM

## 2025-06-18 PROCEDURE — 99024 POSTOP FOLLOW-UP VISIT: CPT | Performed by: OPHTHALMOLOGY

## 2025-06-18 ASSESSMENT — REFRACTION_MANIFEST
OS_SPHERE: -1.25
OD_AXIS: 020
OS_SPHERE: +0.75
OS_VA1: 20/20
OS_AXIS: 076
OD_SPHERE: +0.50
OD_CYLINDER: +1.00
OD_VA1: 20/30-
OS_CYLINDER: -1.00
OS_AXIS: 035
OS_CYLINDER: +1.50
OS_VA1: 20/50-2

## 2025-06-18 ASSESSMENT — REFRACTION_AUTOREFRACTION
OD_SPHERE: -0.50
OS_SPHERE: -0.75
OS_CYLINDER: +1.00
OD_CYLINDER: +1.25
OS_AXIS: 166
OD_AXIS: 022

## 2025-06-18 ASSESSMENT — LID EXAM ASSESSMENTS
OS_BLEPHARITIS: LUL T 1+
OD_BLEPHARITIS: RUL T 1+
OD_MEIBOMITIS: RLL
OD_COMMENTS: MISSING/SHORTENED GLANDS

## 2025-06-18 ASSESSMENT — REFRACTION_CURRENTRX
OS_ADD: +3.00
OD_VPRISM_DIRECTION: PROGS
OS_OVR_VA: 20/
OD_SPHERE: +0.25
OD_OVR_VA: 20/
OD_ADD: +3.00
OS_AXIS: 161
OS_CYLINDER: +0.50
OD_AXIS: 021
OD_CYLINDER: +1.00
OS_SPHERE: -0.25
OS_VPRISM_DIRECTION: PROGS

## 2025-06-18 ASSESSMENT — TONOMETRY: OS_IOP_MMHG: 16

## 2025-06-18 ASSESSMENT — VISUAL ACUITY
OD_BCVA: 20/25
OS_BCVA: 20/60+1

## 2025-06-18 ASSESSMENT — KERATOMETRY
OS_AXISANGLE_DEGREES: 125
OS_K1POWER_DIOPTERS: 44.25
OD_AXISANGLE_DEGREES: 090
OD_K1POWER_DIOPTERS: 44.75
OS_K2POWER_DIOPTERS: 45.00
OD_K2POWER_DIOPTERS: 44.75

## 2025-06-18 ASSESSMENT — CORNEAL EDEMA - FOLDS/STRIAE: OS_FOLDSSTRIAE: 1+

## 2025-06-18 ASSESSMENT — CORNEAL EDEMA - MICROCYSTIC EPITHELIAL EDEMA (MCE): OS_MCE: 1+

## 2025-06-27 ENCOUNTER — DOCTOR'S OFFICE (OUTPATIENT)
Facility: LOCATION | Age: 80
Setting detail: OPHTHALMOLOGY
End: 2025-06-27
Payer: MEDICARE

## 2025-06-27 DIAGNOSIS — Z96.1: ICD-10-CM

## 2025-06-27 PROBLEM — H25.11 CATARACT SENILE NUCLEAR SCLEROSIS; RIGHT EYE: Status: RESOLVED | Noted: 2025-06-13 | Resolved: 2025-06-27

## 2025-06-27 PROCEDURE — 99024 POSTOP FOLLOW-UP VISIT: CPT | Performed by: OPHTHALMOLOGY

## 2025-06-27 ASSESSMENT — CONFRONTATIONAL VISUAL FIELD TEST (CVF)
OS_FINDINGS: FULL
OD_FINDINGS: FULL

## 2025-06-27 ASSESSMENT — KERATOMETRY
OS_AXISANGLE_DEGREES: 125
OD_K2POWER_DIOPTERS: 44.75
OS_K2POWER_DIOPTERS: 45.00
OD_K1POWER_DIOPTERS: 44.75
OS_K1POWER_DIOPTERS: 44.25
OD_AXISANGLE_DEGREES: 090

## 2025-06-27 ASSESSMENT — REFRACTION_AUTOREFRACTION
OS_SPHERE: -0.75
OS_AXIS: 166
OD_CYLINDER: +1.25
OD_SPHERE: -0.50
OS_CYLINDER: +1.00
OD_AXIS: 022

## 2025-06-27 ASSESSMENT — REFRACTION_CURRENTRX
OD_VPRISM_DIRECTION: PROGS
OS_CYLINDER: +0.50
OD_CYLINDER: +1.00
OD_SPHERE: +0.25
OS_OVR_VA: 20/
OS_VPRISM_DIRECTION: PROGS
OD_ADD: +3.00
OD_OVR_VA: 20/
OS_ADD: +3.00
OS_SPHERE: -0.25
OD_AXIS: 021
OS_AXIS: 161

## 2025-06-27 ASSESSMENT — REFRACTION_MANIFEST
OS_CYLINDER: +1.50
OS_SPHERE: -1.25
OS_AXIS: 035
OD_VA1: 20/30-
OS_CYLINDER: -1.00
OS_AXIS: 076
OD_AXIS: 020
OD_SPHERE: +0.50
OS_SPHERE: +0.75
OS_VA1: 20/20
OS_VA1: 20/50-2
OD_CYLINDER: +1.00

## 2025-06-27 ASSESSMENT — VISUAL ACUITY
OD_BCVA: 20/25
OS_BCVA: 20/60+1

## 2025-06-27 ASSESSMENT — CORNEAL EDEMA - MICROCYSTIC EPITHELIAL EDEMA (MCE): OS_MCE: 1+

## 2025-06-27 ASSESSMENT — CORNEAL EDEMA - FOLDS/STRIAE: OS_FOLDSSTRIAE: 1+

## 2025-07-02 ENCOUNTER — RX ONLY (RX ONLY)
Age: 80
End: 2025-07-02

## 2025-07-02 ENCOUNTER — DOCTOR'S OFFICE (OUTPATIENT)
Facility: LOCATION | Age: 80
Setting detail: OPHTHALMOLOGY
End: 2025-07-02
Payer: MEDICARE

## 2025-07-02 DIAGNOSIS — Z96.1: ICD-10-CM

## 2025-07-02 PROCEDURE — 99024 POSTOP FOLLOW-UP VISIT: CPT | Performed by: OPHTHALMOLOGY

## 2025-07-02 ASSESSMENT — REFRACTION_MANIFEST
OD_SPHERE: +0.50
OS_VA1: 20/20
OS_CYLINDER: -1.00
OS_AXIS: 035
OS_VA1: 20/50-2
OD_CYLINDER: +1.00
OS_SPHERE: +0.75
OS_CYLINDER: +1.50
OD_VA1: 20/30-
OD_AXIS: 020
OS_SPHERE: -1.25
OS_AXIS: 076

## 2025-07-02 ASSESSMENT — KERATOMETRY
OS_AXISANGLE_DEGREES: 158
OS_K1POWER_DIOPTERS: 44.25
OD_K1POWER_DIOPTERS: 44.50
OD_AXISANGLE_DEGREES: 090
OD_K2POWER_DIOPTERS: 44.50
OS_K2POWER_DIOPTERS: 45.00

## 2025-07-02 ASSESSMENT — REFRACTION_AUTOREFRACTION
OD_CYLINDER: +1.25
OD_SPHERE: -0.75
OS_AXIS: 171
OS_SPHERE: -1.00
OS_CYLINDER: +1.25
OD_AXIS: 009

## 2025-07-02 ASSESSMENT — VISUAL ACUITY
OD_BCVA: 20/25 -1
OS_BCVA: 20/30 -1

## 2025-07-02 ASSESSMENT — REFRACTION_CURRENTRX
OS_OVR_VA: 20/
OD_CYLINDER: +1.00
OS_VPRISM_DIRECTION: PROGS
OD_ADD: +3.00
OD_VPRISM_DIRECTION: PROGS
OD_SPHERE: +0.25
OS_AXIS: 161
OD_OVR_VA: 20/
OD_AXIS: 021
OS_CYLINDER: +0.50
OS_ADD: +3.00
OS_SPHERE: -0.25

## 2025-07-02 ASSESSMENT — CORNEAL DYSTROPHY - POSTERIOR: OD_POSTERIORDYSTROPHY: 1+ GUTTATA

## 2025-07-02 ASSESSMENT — CONFRONTATIONAL VISUAL FIELD TEST (CVF)
OS_FINDINGS: FULL
OD_FINDINGS: FULL

## 2025-07-02 ASSESSMENT — CORNEAL EDEMA - MICROCYSTIC EPITHELIAL EDEMA (MCE): OS_MCE: 1+

## 2025-07-02 ASSESSMENT — TONOMETRY: OD_IOP_MMHG: 16

## 2025-07-02 ASSESSMENT — CORNEAL EDEMA - FOLDS/STRIAE: OS_FOLDSSTRIAE: 1+

## 2025-07-16 ENCOUNTER — DOCTOR'S OFFICE (OUTPATIENT)
Facility: LOCATION | Age: 80
Setting detail: OPHTHALMOLOGY
End: 2025-07-16
Payer: MEDICARE

## 2025-07-16 DIAGNOSIS — H26.492: ICD-10-CM

## 2025-07-16 DIAGNOSIS — Z96.1: ICD-10-CM

## 2025-07-16 PROCEDURE — 99024 POSTOP FOLLOW-UP VISIT: CPT | Performed by: OPHTHALMOLOGY

## 2025-07-16 ASSESSMENT — KERATOMETRY
OS_K1POWER_DIOPTERS: 45.00
OS_AXISANGLE_DEGREES: 090
OS_K2POWER_DIOPTERS: 45.00
OD_K2POWER_DIOPTERS: 44.25
OD_AXISANGLE_DEGREES: 023
OD_K1POWER_DIOPTERS: 44.00

## 2025-07-16 ASSESSMENT — REFRACTION_AUTOREFRACTION
OD_CYLINDER: +1.00
OS_AXIS: 169
OS_SPHERE: -1.00
OD_SPHERE: -0.75
OD_AXIS: 007
OS_CYLINDER: +1.25

## 2025-07-16 ASSESSMENT — REFRACTION_MANIFEST
OS_VA1: 20/20-
OD_VA1: 20/NI
OD_CYLINDER: +1.00
OD_VA1: 20/30-
OS_CYLINDER: +1.00
OS_CYLINDER: +1.50
OS_AXIS: 076
OD_AXIS: 020
OS_SPHERE: -1.00
OD_AXIS: 007
OD_CYLINDER: +1.00
OS_SPHERE: -1.25
OS_AXIS: 035
OS_CYLINDER: -1.00
OD_SPHERE: -0.75
OS_VA1: 20/50-2
OD_SPHERE: +0.50
OS_AXIS: 170
OS_VA1: 20/20
OS_SPHERE: +0.75

## 2025-07-16 ASSESSMENT — REFRACTION_CURRENTRX
OD_VPRISM_DIRECTION: PROGS
OS_AXIS: 161
OS_ADD: +3.00
OD_SPHERE: +0.25
OS_OVR_VA: 20/
OD_ADD: +3.00
OD_AXIS: 021
OS_CYLINDER: +0.50
OD_OVR_VA: 20/
OD_CYLINDER: +1.00
OS_VPRISM_DIRECTION: PROGS
OS_SPHERE: -0.25

## 2025-07-16 ASSESSMENT — CONFRONTATIONAL VISUAL FIELD TEST (CVF)
OS_FINDINGS: FULL
OD_FINDINGS: FULL

## 2025-07-16 ASSESSMENT — VISUAL ACUITY
OD_BCVA: 20/20-2
OS_BCVA: 20/20

## 2025-07-16 ASSESSMENT — LID EXAM ASSESSMENTS
OD_COMMENTS: MISSING/SHORTENED GLANDS
OS_BLEPHARITIS: LUL T 1+
OD_BLEPHARITIS: RUL T 1+
OD_MEIBOMITIS: RLL

## 2025-07-16 ASSESSMENT — TONOMETRY
OD_IOP_MMHG: 14
OS_IOP_MMHG: 12

## 2025-07-21 NOTE — PATIENT PROFILE ADULT - NSPROGENOTHERPROVIDER_GEN_A_NUR
Orders:    buPROPion (WELLBUTRIN XL) 150 mg 24 hr tablet; Take 1 tablet (150 mg total) by mouth every morning     none